# Patient Record
Sex: FEMALE | Employment: OTHER | URBAN - METROPOLITAN AREA
[De-identification: names, ages, dates, MRNs, and addresses within clinical notes are randomized per-mention and may not be internally consistent; named-entity substitution may affect disease eponyms.]

---

## 2018-09-04 ENCOUNTER — HOSPITAL ENCOUNTER (OUTPATIENT)
Age: 58
Setting detail: OBSERVATION
LOS: 1 days | Discharge: HOME | End: 2018-09-05
Attending: EMERGENCY MEDICINE | Admitting: HOSPITALIST
Payer: SELF-PAY

## 2018-09-04 ENCOUNTER — APPOINTMENT (OUTPATIENT)
Dept: GENERAL RADIOLOGY | Age: 58
End: 2018-09-04
Attending: EMERGENCY MEDICINE
Payer: SELF-PAY

## 2018-09-04 DIAGNOSIS — I16.1 HYPERTENSIVE EMERGENCY: ICD-10-CM

## 2018-09-04 DIAGNOSIS — R77.8 ELEVATED TROPONIN I LEVEL: ICD-10-CM

## 2018-09-04 DIAGNOSIS — R07.9 CHEST PAIN, UNSPECIFIED TYPE: ICD-10-CM

## 2018-09-04 DIAGNOSIS — I48.91 ATRIAL FIBRILLATION WITH RVR (HCC): Primary | ICD-10-CM

## 2018-09-04 PROBLEM — R07.89 ATYPICAL CHEST PAIN: Status: ACTIVE | Noted: 2018-09-04

## 2018-09-04 LAB
ALBUMIN SERPL-MCNC: 3.6 G/DL (ref 3.4–5)
ALBUMIN/GLOB SERPL: 1 {RATIO} (ref 0.8–1.7)
ALP SERPL-CCNC: 137 U/L (ref 45–117)
ALT SERPL-CCNC: 83 U/L (ref 13–56)
ANION GAP SERPL CALC-SCNC: 7 MMOL/L (ref 3–18)
APTT PPP: 28.3 SEC (ref 23–36.4)
AST SERPL-CCNC: 45 U/L (ref 15–37)
BASOPHILS # BLD: 0 K/UL (ref 0–0.1)
BASOPHILS # BLD: 0 K/UL (ref 0–0.1)
BASOPHILS NFR BLD: 0 % (ref 0–2)
BASOPHILS NFR BLD: 0 % (ref 0–2)
BILIRUB SERPL-MCNC: 0.6 MG/DL (ref 0.2–1)
BUN SERPL-MCNC: 12 MG/DL (ref 7–18)
BUN/CREAT SERPL: 13 (ref 12–20)
CALCIUM SERPL-MCNC: 8.6 MG/DL (ref 8.5–10.1)
CHLORIDE SERPL-SCNC: 106 MMOL/L (ref 100–108)
CO2 SERPL-SCNC: 30 MMOL/L (ref 21–32)
CREAT SERPL-MCNC: 0.96 MG/DL (ref 0.6–1.3)
DIFFERENTIAL METHOD BLD: ABNORMAL
DIFFERENTIAL METHOD BLD: ABNORMAL
EOSINOPHIL # BLD: 0.1 K/UL (ref 0–0.4)
EOSINOPHIL # BLD: 0.1 K/UL (ref 0–0.4)
EOSINOPHIL NFR BLD: 1 % (ref 0–5)
EOSINOPHIL NFR BLD: 1 % (ref 0–5)
ERYTHROCYTE [DISTWIDTH] IN BLOOD BY AUTOMATED COUNT: 14.2 % (ref 11.6–14.5)
ERYTHROCYTE [DISTWIDTH] IN BLOOD BY AUTOMATED COUNT: 14.3 % (ref 11.6–14.5)
GLOBULIN SER CALC-MCNC: 3.5 G/DL (ref 2–4)
GLUCOSE SERPL-MCNC: 88 MG/DL (ref 74–99)
HCT VFR BLD AUTO: 38 % (ref 35–45)
HCT VFR BLD AUTO: 38.4 % (ref 35–45)
HGB BLD-MCNC: 12.4 G/DL (ref 12–16)
HGB BLD-MCNC: 12.8 G/DL (ref 12–16)
LYMPHOCYTES # BLD: 1.9 K/UL (ref 0.9–3.6)
LYMPHOCYTES # BLD: 2.2 K/UL (ref 0.9–3.6)
LYMPHOCYTES NFR BLD: 19 % (ref 21–52)
LYMPHOCYTES NFR BLD: 23 % (ref 21–52)
MCH RBC QN AUTO: 29.6 PG (ref 24–34)
MCH RBC QN AUTO: 29.9 PG (ref 24–34)
MCHC RBC AUTO-ENTMCNC: 32.6 G/DL (ref 31–37)
MCHC RBC AUTO-ENTMCNC: 33.3 G/DL (ref 31–37)
MCV RBC AUTO: 89.7 FL (ref 74–97)
MCV RBC AUTO: 90.7 FL (ref 74–97)
MONOCYTES # BLD: 0.7 K/UL (ref 0.05–1.2)
MONOCYTES # BLD: 0.7 K/UL (ref 0.05–1.2)
MONOCYTES NFR BLD: 7 % (ref 3–10)
MONOCYTES NFR BLD: 8 % (ref 3–10)
NEUTS SEG # BLD: 6.6 K/UL (ref 1.8–8)
NEUTS SEG # BLD: 7.4 K/UL (ref 1.8–8)
NEUTS SEG NFR BLD: 68 % (ref 40–73)
NEUTS SEG NFR BLD: 73 % (ref 40–73)
PLATELET # BLD AUTO: 155 K/UL (ref 135–420)
PLATELET # BLD AUTO: 169 K/UL (ref 135–420)
PMV BLD AUTO: 13.1 FL (ref 9.2–11.8)
PMV BLD AUTO: 13.8 FL (ref 9.2–11.8)
POTASSIUM SERPL-SCNC: 3.3 MMOL/L (ref 3.5–5.5)
PROT SERPL-MCNC: 7.1 G/DL (ref 6.4–8.2)
RBC # BLD AUTO: 4.19 M/UL (ref 4.2–5.3)
RBC # BLD AUTO: 4.28 M/UL (ref 4.2–5.3)
SODIUM SERPL-SCNC: 143 MMOL/L (ref 136–145)
T4 FREE SERPL-MCNC: 1.1 NG/DL (ref 0.7–1.5)
TROPONIN I SERPL-MCNC: 0.25 NG/ML (ref 0–0.04)
TROPONIN I SERPL-MCNC: 0.29 NG/ML (ref 0–0.04)
TSH SERPL DL<=0.05 MIU/L-ACNC: 0.12 UIU/ML (ref 0.36–3.74)
WBC # BLD AUTO: 10.2 K/UL (ref 4.6–13.2)
WBC # BLD AUTO: 9.6 K/UL (ref 4.6–13.2)

## 2018-09-04 PROCEDURE — 99285 EMERGENCY DEPT VISIT HI MDM: CPT

## 2018-09-04 PROCEDURE — 65660000000 HC RM CCU STEPDOWN

## 2018-09-04 PROCEDURE — 74011000258 HC RX REV CODE- 258: Performed by: HOSPITALIST

## 2018-09-04 PROCEDURE — 71045 X-RAY EXAM CHEST 1 VIEW: CPT

## 2018-09-04 PROCEDURE — 96361 HYDRATE IV INFUSION ADD-ON: CPT

## 2018-09-04 PROCEDURE — 84443 ASSAY THYROID STIM HORMONE: CPT | Performed by: EMERGENCY MEDICINE

## 2018-09-04 PROCEDURE — 36415 COLL VENOUS BLD VENIPUNCTURE: CPT | Performed by: HOSPITALIST

## 2018-09-04 PROCEDURE — 74011000258 HC RX REV CODE- 258: Performed by: EMERGENCY MEDICINE

## 2018-09-04 PROCEDURE — 85025 COMPLETE CBC W/AUTO DIFF WBC: CPT | Performed by: EMERGENCY MEDICINE

## 2018-09-04 PROCEDURE — 84439 ASSAY OF FREE THYROXINE: CPT | Performed by: EMERGENCY MEDICINE

## 2018-09-04 PROCEDURE — 93005 ELECTROCARDIOGRAM TRACING: CPT

## 2018-09-04 PROCEDURE — 96374 THER/PROPH/DIAG INJ IV PUSH: CPT

## 2018-09-04 PROCEDURE — 74011000250 HC RX REV CODE- 250: Performed by: EMERGENCY MEDICINE

## 2018-09-04 PROCEDURE — 80053 COMPREHEN METABOLIC PANEL: CPT | Performed by: EMERGENCY MEDICINE

## 2018-09-04 PROCEDURE — 94761 N-INVAS EAR/PLS OXIMETRY MLT: CPT

## 2018-09-04 PROCEDURE — 74011250636 HC RX REV CODE- 250/636: Performed by: EMERGENCY MEDICINE

## 2018-09-04 PROCEDURE — 84484 ASSAY OF TROPONIN QUANT: CPT | Performed by: EMERGENCY MEDICINE

## 2018-09-04 PROCEDURE — 85730 THROMBOPLASTIN TIME PARTIAL: CPT | Performed by: HOSPITALIST

## 2018-09-04 PROCEDURE — 74011250637 HC RX REV CODE- 250/637: Performed by: EMERGENCY MEDICINE

## 2018-09-04 PROCEDURE — 74011250636 HC RX REV CODE- 250/636: Performed by: HOSPITALIST

## 2018-09-04 RX ORDER — GUAIFENESIN 100 MG/5ML
81 LIQUID (ML) ORAL DAILY
Status: DISCONTINUED | OUTPATIENT
Start: 2018-09-05 | End: 2018-09-05 | Stop reason: HOSPADM

## 2018-09-04 RX ORDER — LEVOTHYROXINE SODIUM 100 UG/1
100 TABLET ORAL
COMMUNITY

## 2018-09-04 RX ORDER — HEPARIN SODIUM 10000 [USP'U]/100ML
18-36 INJECTION, SOLUTION INTRAVENOUS
Status: DISCONTINUED | OUTPATIENT
Start: 2018-09-04 | End: 2018-09-05

## 2018-09-04 RX ORDER — LORATADINE 10 MG/1
10 TABLET ORAL ONCE
Status: COMPLETED | OUTPATIENT
Start: 2018-09-04 | End: 2018-09-04

## 2018-09-04 RX ORDER — ACETAMINOPHEN 325 MG/1
650 TABLET ORAL
Status: DISCONTINUED | OUTPATIENT
Start: 2018-09-04 | End: 2018-09-05 | Stop reason: HOSPADM

## 2018-09-04 RX ORDER — ATORVASTATIN CALCIUM 40 MG/1
40 TABLET, FILM COATED ORAL
Status: DISCONTINUED | OUTPATIENT
Start: 2018-09-04 | End: 2018-09-04

## 2018-09-04 RX ORDER — HEPARIN SODIUM 1000 [USP'U]/ML
80 INJECTION, SOLUTION INTRAVENOUS; SUBCUTANEOUS ONCE
Status: COMPLETED | OUTPATIENT
Start: 2018-09-04 | End: 2018-09-04

## 2018-09-04 RX ORDER — ATORVASTATIN CALCIUM 40 MG/1
40 TABLET, FILM COATED ORAL DAILY
Status: DISCONTINUED | OUTPATIENT
Start: 2018-09-06 | End: 2018-09-05 | Stop reason: HOSPADM

## 2018-09-04 RX ORDER — ONDANSETRON 2 MG/ML
4 INJECTION INTRAMUSCULAR; INTRAVENOUS
Status: DISCONTINUED | OUTPATIENT
Start: 2018-09-04 | End: 2018-09-05 | Stop reason: HOSPADM

## 2018-09-04 RX ORDER — SODIUM CHLORIDE 9 MG/ML
75 INJECTION, SOLUTION INTRAVENOUS CONTINUOUS
Status: DISCONTINUED | OUTPATIENT
Start: 2018-09-04 | End: 2018-09-05 | Stop reason: HOSPADM

## 2018-09-04 RX ORDER — LISINOPRIL 20 MG/1
20 TABLET ORAL DAILY
COMMUNITY
End: 2019-12-25

## 2018-09-04 RX ORDER — NITROGLYCERIN 0.4 MG/1
0.4 TABLET SUBLINGUAL AS NEEDED
Status: COMPLETED | OUTPATIENT
Start: 2018-09-04 | End: 2018-09-05

## 2018-09-04 RX ORDER — DILTIAZEM HYDROCHLORIDE 5 MG/ML
20 INJECTION INTRAVENOUS
Status: COMPLETED | OUTPATIENT
Start: 2018-09-04 | End: 2018-09-04

## 2018-09-04 RX ORDER — GUAIFENESIN 100 MG/5ML
324 LIQUID (ML) ORAL
Status: COMPLETED | OUTPATIENT
Start: 2018-09-04 | End: 2018-09-04

## 2018-09-04 RX ADMIN — SODIUM CHLORIDE 1000 ML: 900 INJECTION, SOLUTION INTRAVENOUS at 19:35

## 2018-09-04 RX ADMIN — HEPARIN SODIUM AND DEXTROSE 18 UNITS/KG/HR: 10000; 5 INJECTION INTRAVENOUS at 22:22

## 2018-09-04 RX ADMIN — SODIUM CHLORIDE 75 ML/HR: 900 INJECTION, SOLUTION INTRAVENOUS at 22:31

## 2018-09-04 RX ADMIN — SODIUM CHLORIDE 5 MG/HR: 900 INJECTION, SOLUTION INTRAVENOUS at 20:40

## 2018-09-04 RX ADMIN — ASPIRIN 81 MG CHEWABLE TABLET 324 MG: 81 TABLET CHEWABLE at 19:43

## 2018-09-04 RX ADMIN — NITROGLYCERIN 0.4 MG: 0.4 TABLET SUBLINGUAL at 19:45

## 2018-09-04 RX ADMIN — HEPARIN SODIUM 8710 UNITS: 1000 INJECTION, SOLUTION INTRAVENOUS; SUBCUTANEOUS at 22:28

## 2018-09-04 RX ADMIN — NITROGLYCERIN 0.4 MG: 0.4 TABLET SUBLINGUAL at 20:13

## 2018-09-04 RX ADMIN — SODIUM CHLORIDE 5 MG/HR: 900 INJECTION, SOLUTION INTRAVENOUS at 21:35

## 2018-09-04 RX ADMIN — DILTIAZEM HYDROCHLORIDE 20 MG: 5 INJECTION INTRAVENOUS at 20:15

## 2018-09-04 RX ADMIN — LORATADINE 10 MG: 10 TABLET ORAL at 19:43

## 2018-09-04 NOTE — IP AVS SNAPSHOT
[address] [address] 
[phone] Patient: Shane Pinto MRN: [id number] HTP:[id number] About your hospitalization You were admitted on:  September 4, 2018 You last received care in the:  [address] [place] [address] You were discharged on:  September 5, 2018 Why you were hospitalized Your primary diagnosis was:  Atrial Fibrillation With Rvr (Hcc) Your diagnoses also included:  Atypical Chest Pain, Htn (Hypertension), Acquired Hypothyroidism Follow-up Information Follow up With Details Comments Contact Info Susan Johnson MD   [address] [place] [address] Drive [facility] [address] [phone] Discharge Orders None A check hope indicates which time of day the medication should be taken. My Medications START taking these medications Instructions Each Dose to Equal  
 Morning Noon Evening Bedtime  
 aspirin 81 mg chewable tablet Start taking on:  9/6/2018 Take 1 Tab by mouth daily. 81 mg  
    
  
   
   
   
  
 atorvastatin 40 mg tablet Commonly known as:  LIPITOR Start taking on:  9/6/2018 Take 1 Tab by mouth daily. 40 mg  
    
  
   
   
   
  
 metoprolol tartrate 25 mg tablet Commonly known as:  LOPRESSOR Take 1 Tab by mouth every twelve (12) hours. 25 mg CONTINUE taking these medications Instructions Each Dose to Equal  
 Morning Noon Evening Bedtime  
 levothyroxine 100 mcg tablet Commonly known as:  SYNTHROID Take 100 mcg by mouth Daily (before breakfast). 100 mcg  
    
  
   
   
   
  
 lisinopril 20 mg tablet Commonly known as:  Cruz Anibal Take 20 mg by mouth daily. 20 mg Where to Get Your Medications These medications were sent to [facility], [address]  [address], [address] [facility] [place] [address] Phone:  [phone]  
  aspirin 81 mg chewable tablet  
 atorvastatin 40 mg tablet  
 metoprolol tartrate 25 mg tablet Discharge Instructions FOLLOW UP VISIT Appointment in: One Week Please follow up with your PCP within 7 days to discuss your recent hospitalization. Patient to arrange. Follow up with cardiology in 2-3 weeks. Patient to arrange. DISCHARGE SUMMARY from Nurse PATIENT INSTRUCTIONS: 
 
 
F-face looks uneven A-arms unable to move or move unevenly S-speech slurred or non-existent T-time-call 911 as soon as signs and symptoms begin-DO NOT go Back to bed or wait to see if you get better-TIME IS BRAIN. Warning Signs of HEART ATTACK Call 911 if you have these symptoms: 
? Chest discomfort. Most heart attacks involve discomfort in the center of the chest that lasts more than a few minutes, or that goes away and comes back. It can feel like uncomfortable pressure, squeezing, fullness, or pain. ? Discomfort in other areas of the upper body. Symptoms can include pain or discomfort in one or both arms, the back, neck, jaw, or stomach. ? Shortness of breath with or without chest discomfort. ? Other signs may include breaking out in a cold sweat, nausea, or lightheadedness. Don't wait more than five minutes to call [address]! Fast action can save your life. Calling 911 is almost always the fastest way to get lifesaving treatment. Emergency Medical Services staff can begin treatment when they arrive  up to an hour sooner than if someone gets to the hospital by car. The discharge information has been reviewed with the patient.   The patient verbalized understanding. Discharge medications reviewed with the patient and appropriate educational materials and side effects teaching were provided. ___________________________________________________________________________________________________________________________________ Patient armband removed and shredded. MyChart Activation Thank you for enrolling in [address] Please follow the instructions below to securely access your online medical record. [employer] allows you to send messages to your doctor, view your test results, renew your prescriptions, schedule appointments, and more. How Do I Sign Up? 1. In your internet browser, go to [email or web address]. 2. Click on the First Time User? Click Here link in the Sign In box. You will see the New Member Sign Up page. 3. Enter your [employer] Access Code exactly as it appears below. You will not need to use this code after youve completed the sign-up process. If you do not sign up before the expiration date, you must request a new code. [employer] Access Code: [email or web address] Expires: 12/3/2018  5:37 PM  
 
4. Enter the last four digits of your Social Security Number (xxxx) and Date of Birth (mm/dd/yyyy) as indicated and click Submit. You will be taken to the next sign-up page. 5. Create a [employer] ID. This will be your [employer] login ID and cannot be changed, so think of one that is secure and easy to remember. 6. Create a [employer] password. You can change your password at any time. 7. Enter your Password Reset Question and Answer. This can be used at a later time if you forget your password. 8. Enter your e-mail address. You will receive e-mail notification when new information is available in [address] 9. Click Sign Up. You can now view your medical record. Additional Information Remember, [employer] is NOT to be used for urgent needs. For medical emergencies, dial 911. Now available from your iPhone and Android! Atrial Fibrillation: Care Instructions Your Care Instructions Atrial fibrillation is an irregular and often fast heartbeat. Treating this condition is important for several reasons. It can cause blood clots, which can travel from your heart to your brain and cause a stroke. If you have a fast heartbeat, you may feel lightheaded, dizzy, and weak. An irregular heartbeat can also increase your risk for heart failure. Atrial fibrillation is often the result of another heart condition, such as high blood pressure or coronary artery disease. Making changes to improve your heart condition will help you stay healthy and active. Follow-up care is a key part of your treatment and safety. Be sure to make and go to all appointments, and call your doctor if you are having problems. It's also a good idea to know your test results and keep a list of the medicines you take. How can you care for yourself at home? Medicines 
  · Take your medicines exactly as prescribed. Call your doctor if you think you are having a problem with your medicine. You will get more details on the specific medicines your doctor prescribes.  
  · If your doctor has given you a blood thinner to prevent a stroke, be sure you get instructions about how to take your medicine safely. Blood thinners can cause serious bleeding problems.  
  · Do not take any vitamins, over-the-counter drugs, or herbal products without talking to your doctor first.  
 Lifestyle changes 
  · Do not smoke. Smoking can increase your chance of a stroke and heart attack. If you need help quitting, talk to your doctor about stop-smoking programs and medicines. These can increase your chances of quitting for good.  
  · Eat a heart-healthy diet.  
  · Stay at a healthy weight. Lose weight if you need to.  
  · Limit alcohol to 2 drinks a day for men and 1 drink a day for women. Too much alcohol can cause health problems.   · Avoid colds and flu. Get a pneumococcal vaccine shot. If you have had one before, ask your doctor whether you need another dose. Get a flu shot every year. If you must be around people with colds or flu, wash your hands often. Activity 
  · If your doctor recommends it, get more exercise. Walking is a good choice. Bit by bit, increase the amount you walk every day. Try for at least 30 minutes on most days of the week. You also may want to swim, bike, or do other activities. Your doctor may suggest that you join a cardiac rehabilitation program so that you can have help increasing your physical activity safely.  
  · Start light exercise if your doctor says it is okay. Even a small amount will help you get stronger, have more energy, and manage stress. Walking is an easy way to get exercise. Start out by walking a little more than you did in the hospital. Gradually increase the amount you walk.  
  · When you exercise, watch for signs that your heart is working too hard. You are pushing too hard if you cannot talk while you are exercising. If you become short of breath or dizzy or have chest pain, sit down and rest immediately.  
  · Check your pulse regularly. Place two fingers on the artery at the palm side of your wrist, in line with your thumb. If your heartbeat seems uneven or fast, talk to your doctor. When should you call for help? Call 911 anytime you think you may need emergency care. For example, call if: 
  · You have symptoms of a heart attack. These may include: ¨ Chest pain or pressure, or a strange feeling in the chest. 
¨ Sweating. ¨ Shortness of breath. ¨ Nausea or vomiting. ¨ Pain, pressure, or a strange feeling in the back, neck, jaw, or upper belly or in one or both shoulders or arms. ¨ Lightheadedness or sudden weakness. ¨ A fast or irregular heartbeat.  
After you call 911, the  may tell you to chew 1 adult-strength or 2 to 4 low-dose aspirin. Wait for an ambulance. Do not try to drive yourself.  
  · You have symptoms of a stroke. These may include: 
¨ Sudden numbness, tingling, weakness, or loss of movement in your face, arm, or leg, especially on only one side of your body. ¨ Sudden vision changes. ¨ Sudden trouble speaking. ¨ Sudden confusion or trouble understanding simple statements. ¨ Sudden problems with walking or balance. ¨ A sudden, severe headache that is different from past headaches.  
  · You passed out (lost consciousness).  
 Call your doctor now or seek immediate medical care if: 
  · You have new or increased shortness of breath.  
  · You feel dizzy or lightheaded, or you feel like you may faint.  
  · Your heart rate becomes irregular.  
  · You can feel your heart flutter in your chest or skip heartbeats. Tell your doctor if these symptoms are new or worse.  
 Watch closely for changes in your health, and be sure to contact your doctor if you have any problems. Where can you learn more? Go to http://charan-melvin.info/. Enter U020 in the search box to learn more about \"Atrial Fibrillation: Care Instructions. \" Current as of: December 6, 2017 Content Version: 11.7 © 3433-9220 navabi. Care instructions adapted under license by [employer] (which disclaims liability or warranty for this information). If you have questions about a medical condition or this instruction, always ask your healthcare professional. [address] [address] any warranty or liability for your use of this information. Learning About Atrial Fibrillation What is atrial fibrillation? Atrial fibrillation (say \"AY-tree-hillary llw-piqs-GET-shun\") is the most common type of irregular heartbeat (arrhythmia). Normally, the heart beats in a strong, steady rhythm.  In atrial fibrillation, a problem with the heart's electrical system causes the two upper parts of the heart (the atria) to quiver, or fibrillate. Your heart rate also may be faster than normal. 
Atrial fibrillation can be dangerous because if the heartbeat isn't strong and steady, blood can collect, or pool, in the atria. And pooled blood is more likely to form clots. Clots can travel to the brain, block blood flow, and cause a stroke. Atrial fibrillation can also lead to heart failure. Treatment for atrial fibrillation helps prevent stroke and heart failure. It also helps relieve symptoms. Atrial fibrillation is often caused by another heart problem. It may happen after heart surgery. It may also be caused by other problems, such as an overactive thyroid gland or lung disease. Many people with atrial fibrillation are able to live full and active lives. What are the symptoms? Some people feel symptoms when they have episodes of atrial fibrillation. But other people don't notice any symptoms. If you have symptoms, you may feel: · A fluttering, racing, or pounding feeling in your chest called palpitations. · Weak or tired. · Dizzy or lightheaded. · Short of breath. · Chest pain. · Confused. You may notice signs of atrial fibrillation when you check your pulse. Your pulse may seem uneven or fast. 
What can you expect when you have atrial fibrillation? At first, spells of atrial fibrillation may come on suddenly and last a short time. It may go away on its own or it goes away after treatment. This is called paroxysmal atrial fibrillation. Over time, the spells may last longer and occur more often. They often don't go away on their own. How is it treated? Treatments can help you feel better and prevent future problems, especially stroke and heart failure. The main types of treatment slow the heart rate, control the heart rhythm, and help prevent stroke.  Your treatment will depend on the cause of your atrial fibrillation, your symptoms, and your risk for stroke. · Heart rate treatment. Medicine may be used to slow your heart rate. Your heartbeat may still be irregular. But these medicines keep your heart from beating too fast. They may also help relieve your symptoms. · Heart rhythm treatment. Different treatments may be used to try to stop atrial fibrillation and keep it from returning. They can also relieve symptoms. These treatments include medicine, electrical cardioversion to shock the heart back to a normal rhythm, a procedure called catheter ablation, and heart surgery. · Stroke prevention. You and your doctor can decide how to lower your risk. You may decide to take a blood-thinning medicine, such as aspirin or an anticoagulant. How can you live well with it? You can live well and help manage atrial fibrillation by having a heart-healthy lifestyle. To have a heart-healthy lifestyle: · Don't smoke. · Eat heart-healthy foods. · Be active. Talk to your doctor about what type and level of exercise is safe for you. · Stay at a healthy weight. Lose weight if you need to. · Manage stress. · Avoid alcohol if it triggers symptoms. · Manage other health problems such as high blood pressure, high cholesterol, and diabetes. · Avoid getting sick from the flu. Get a flu shot every year. Where can you learn more? Go to http://charan-melvin.info/. Enter [phone] in the search box to learn more about \"Learning About Atrial Fibrillation. \" Current as of: December 6, 2017 Content Version: 11.7 © 2489-5972 Share[employer]k. Care instructions adapted under license by [employer] (which disclaims liability or warranty for this information). If you have questions about a medical condition or this instruction, always ask your healthcare professional. [address] [address] any warranty or liability for your use of this information. Deciding Between Electrical Cardioversion and Rate Control Medicines for Atrial Fibrillation Deciding Between Electrical Cardioversion and Rate Control Medicines for Atrial Fibrillation What is atrial fibrillation? Atrial fibrillation (say \"FLAKITA-tree-hillary iou-ixya-ZSW-shun\") is a kind of uneven heartbeat. It can make you feel lightheaded and dizzy. You may feel weak. It also can make you more likely to have a stroke. Electrical cardioversion can return your heart to a normal rhythm. First you'll get medicines to make you sleepy and control pain. Then your doctor will use patches to send an electric current to your heart. This resets the rhythm of your heart. Not everyone with atrial fibrillation needs this treatment. For some people, taking medicines may be better. Most people can live with an uneven heartbeat. It just has to be kept under control so the heart does not beat too fast. 
Use this information to help you and your doctor decide which treatment to choose for atrial fibrillation. What are carrera points about this decision? · Electrical cardioversion can return your heart to a normal rhythm. But the problem can come back. The longer you have had atrial fibrillation, the more likely it is to come back after this treatment. · Cardioversion may not work as well when an uneven heartbeat is caused by another heart disease, such as heart failure. · If your symptoms bother you a lot, you may want to try cardioversion. But even if it works, you may still need to take blood thinners to prevent a stroke. · If you don't have symptoms, or if they don't bother you much, you can try medicines to slow your heart rate. And you can take blood thinners to prevent a stroke. · Cardioversion does have risks, such as stroke. Discuss the risks with your doctor. Make sure you understand them. · You may have more than one heart problem. Cardioversion doesn't work as well if you have more than one heart problem. Why might you choose electrical cardioversion? · It restores the normal heart rhythm for most people. · The idea of having an electric shock does not bother you. · Your symptoms bother you a lot. · You have had atrial fibrillation just one time. · You do not have other heart problems. · You may not have to take as many medicines. Or you may not need to take them as long. Why might you choose rate-control medicines? · These medicines keep many people from having symptoms. · You prefer to take medicines rather than have an electric shock. · Your symptoms don't bother you much. · If these medicines don't work, you can still try electrical cardioversion. Your decision Thinking about the facts and your feelings can help you make a decision that is right for you. Be sure you understand the benefits and risks of your options. And think about what else you need to do before you make the decision. Where can you learn more? Go to http://charan-melvin.info/. Enter O971 in the search box to learn more about \"Deciding Between Electrical Cardioversion and Rate Control Medicines for Atrial Fibrillation. \" Current as of: December 6, 2017 Content Version: 11.7 © 9054-9879 Lok[employer]. Care instructions adapted under license by [employer] (which disclaims liability or warranty for this information). If you have questions about a medical condition or this instruction, always ask your healthcare professional. [address] [address] any warranty or liability for your use of this information. Musculoskeletal Chest Pain: Care Instructions Your Care Instructions Chest pain is not always a sign that something is wrong with your heart or that you have another serious problem. The doctor thinks your chest pain is caused by strained muscles or ligaments, inflamed chest cartilage, or another problem in your chest, rather than by your heart.  You may need more tests to find the cause of your chest pain. Follow-up care is a key part of your treatment and safety. Be sure to make and go to all appointments, and call your doctor if you are having problems. It's also a good idea to know your test results and keep a list of the medicines you take. How can you care for yourself at home? · Take pain medicines exactly as directed. ¨ If the doctor gave you a prescription medicine for pain, take it as prescribed. ¨ If you are not taking a prescription pain medicine, ask your doctor if you can take an over-the-counter medicine. · Rest and protect the sore area. · Stop, change, or take a break from any activity that may be causing your pain or soreness. · Put ice or a cold pack on the sore area for 10 to 20 minutes at a time. Try to do this every 1 to 2 hours for the next 3 days (when you are awake) or until the swelling goes down. Put a thin cloth between the ice and your skin. · After 2 or 3 days, apply a heating pad set on low or a warm cloth to the area that hurts. Some doctors suggest that you go back and forth between hot and cold. · Do not wrap or tape your ribs for support. This may cause you to take smaller breaths, which could increase your risk of lung problems. · Mentholated creams such as Bengay or Icy Hot may soothe sore muscles. Follow the instructions on the package. · Follow your doctor's instructions for exercising. · Gentle stretching and massage may help you get better faster. Stretch slowly to the point just before pain begins, and hold the stretch for at least 15 to 30 seconds. Do this 3 or 4 times a day. Stretch just after you have applied heat. · As your pain gets better, slowly return to your normal activities. Any increased pain may be a sign that you need to rest a while longer. When should you call for help? Call 911 anytime you think you may need emergency care. For example, call if:   · You have chest pain or pressure. This may occur with: ¨ Sweating. ¨ Shortness of breath. ¨ Nausea or vomiting. ¨ Pain that spreads from the chest to the neck, jaw, or one or both shoulders or arms. ¨ Dizziness or lightheadedness. ¨ A fast or uneven pulse. After calling 911, chew 1 adult-strength aspirin. Wait for an ambulance. Do not try to drive yourself.  
  · You have sudden chest pain and shortness of breath, or you cough up blood.  
 Call your doctor now or seek immediate medical care if: 
  · You have any trouble breathing.  
  · Your chest pain gets worse.  
  · Your chest pain occurs consistently with exercise and is relieved by rest.  
 Watch closely for changes in your health, and be sure to contact your doctor if: 
  · Your chest pain does not get better after 1 week. Where can you learn more? Go to http://charanAppyZoomelvin.info/. Enter V293 in the search box to learn more about \"Musculoskeletal Chest Pain: Care Instructions. \" Current as of: November 20, 2017 Content Version: 11.7 © 0183-3845 [employer] ([employer]). Care instructions adapted under license by [employer] (which disclaims liability or warranty for this information). If you have questions about a medical condition or this instruction, always ask your healthcare professional. [address] any warranty or liability for your use of this information. Chest Pain: Care Instructions Your Care Instructions There are many things that can cause chest pain. Some are not serious and will get better on their own in a few days. But some kinds of chest pain need more testing and treatment. Your doctor may have recommended a follow-up visit in the next 8 to 12 hours. If you are not getting better, you may need more tests or treatment. Even though your doctor has released you, you still need to watch for any problems.  The doctor carefully checked you, but sometimes problems can develop later. If you have new symptoms or if your symptoms do not get better, get medical care right away. If you have worse or different chest pain or pressure that lasts more than 5 minutes or you passed out (lost consciousness), call 911 or seek other emergency help right away. A medical visit is only one step in your treatment. Even if you feel better, you still need to do what your doctor recommends, such as going to all suggested follow-up appointments and taking medicines exactly as directed. This will help you recover and help prevent future problems. How can you care for yourself at home? · Rest until you feel better. · Take your medicine exactly as prescribed. Call your doctor if you think you are having a problem with your medicine. · Do not drive after taking a prescription pain medicine. When should you call for help? Call 911 if: 
  · You passed out (lost consciousness).  
  · You have severe difficulty breathing.  
  · You have symptoms of a heart attack. These may include: ¨ Chest pain or pressure, or a strange feeling in your chest. 
¨ Sweating. ¨ Shortness of breath. ¨ Nausea or vomiting. ¨ Pain, pressure, or a strange feeling in your back, neck, jaw, or upper belly or in one or both shoulders or arms. ¨ Lightheadedness or sudden weakness. ¨ A fast or irregular heartbeat. After you call 911, the  may tell you to chew 1 adult-strength or 2 to 4 low-dose aspirin. Wait for an ambulance. Do not try to drive yourself.  
 Call your doctor today if: 
  · You have any trouble breathing.  
  · Your chest pain gets worse.  
  · You are dizzy or lightheaded, or you feel like you may faint.  
  · You are not getting better as expected.  
  · You are having new or different chest pain. Where can you learn more? Go to http://[email or web address]-melvin.info/. Enter A120 in the search box to learn more about \"Chest Pain: Care Instructions. \" Current as of: November 20, 2017 Content Version: 11.7 © 1729-6978 A[employer] Digital Music. Care instructions adapted under license by [employer] (which disclaims liability or warranty for this information). If you have questions about a medical condition or this instruction, always ask your healthcare professional. [address] any warranty or liability for your use of this information. Acute High Blood Pressure: Care Instructions Your Care Instructions Acute high blood pressure is very high blood pressure. It's a serious problem. Very high blood pressure can damage your brain, heart, eyes, and kidneys. You may have been given medicines to lower your blood pressure. You may have gotten them as pills or through a needle in one of your veins. This is called an IV. And maybe you were given other medicines too. These can be needed when high blood pressure causes other problems. To keep your blood pressure at a lower level, you may need to make healthy lifestyle changes. And you will probably need to take medicines. Be sure to follow up with your doctor about your blood pressure and what you can do about it. Follow-up care is a key part of your treatment and safety. Be sure to make and go to all appointments, and call your doctor if you are having problems. It's also a good idea to know your test results and keep a list of the medicines you take. How can you care for yourself at home? · See your doctor as often as he or she recommends. This is to make sure your blood pressure is under control. You will probably go at least 2 times a year. But it may be more often at first. 
· Take your blood pressure medicine exactly as prescribed. You may take one or more types. They include diuretics, beta-blockers, ACE inhibitors, calcium channel blockers, and angiotensin II receptor blockers. Call your doctor if you think you are having a problem with your medicine. · If you take blood pressure medicine, talk to your doctor before you take decongestants or anti-inflammatory medicine, such as ibuprofen. These can raise blood pressure. · Learn how to check your blood pressure at home. Check it often. · Ask your doctor if it's okay to drink alcohol. · Talk to your doctor about lifestyle changes that can help blood pressure. These include being active and not smoking. When should you call for help? Call 911 anytime you think you may need emergency care. This may mean having symptoms that suggest that your blood pressure is causing a serious heart or blood vessel problem. Your blood pressure may be over 180/110. 
 For example, call 911 if: 
  · You have symptoms of a heart attack. These may include: ¨ Chest pain or pressure, or a strange feeling in the chest. 
¨ Sweating. ¨ Shortness of breath. ¨ Nausea or vomiting. ¨ Pain, pressure, or a strange feeling in the back, neck, jaw, or upper belly or in one or both shoulders or arms. ¨ Lightheadedness or sudden weakness. ¨ A fast or irregular heartbeat.  
  · You have symptoms of a stroke. These may include: 
¨ Sudden numbness, tingling, weakness, or loss of movement in your face, arm, or leg, especially on only one side of your body. ¨ Sudden vision changes. ¨ Sudden trouble speaking. ¨ Sudden confusion or trouble understanding simple statements. ¨ Sudden problems with walking or balance. ¨ A sudden, severe headache that is different from past headaches.  
  · You have severe back or belly pain.  
 Do not wait until your blood pressure comes down on its own. Get help right away. 
 Call your doctor now or seek immediate care if: 
  · Your blood pressure is much higher than normal (such as 180/110 or higher), but you don't have symptoms.  
  · You think high blood pressure is causing symptoms, such as: ¨ Severe headache. ¨ Blurry vision.  
 Watch closely for changes in your health, and be sure to contact your doctor if:   · Your blood pressure measures 140/90 or higher at least 2 times. That means the top number is 140 or higher or the bottom number is 90 or higher, or both.  
  · You think you may be having side effects from your blood pressure medicine.  
  · Your blood pressure is usually normal, but it goes above normal at least 2 times. Where can you learn more? Go to http://charan-melvin.info/. Enter L884 in the search box to learn more about \"Acute High Blood Pressure: Care Instructions. \" Current as of: May 10, 2017 Content Version: 11.7 © 9797-3665 ServiceGems. Care instructions adapted under license by [employer] (which disclaims liability or warranty for this information). If you have questions about a medical condition or this instruction, always ask your healthcare professional. [address] any warranty or liability for your use of this information. Low Sodium Diet (2,000 Milligram): Care Instructions Your Care Instructions Too much sodium causes your body to hold on to extra water. This can raise your blood pressure and force your heart and kidneys to work harder. In very serious cases, this could cause you to be put in the hospital. It might even be life-threatening. By limiting sodium, you will feel better and lower your risk of serious problems. The most common source of sodium is salt. People get most of the salt in their diet from canned, prepared, and packaged foods. Fast food and restaurant meals also are very high in sodium. Your doctor will probably limit your sodium to less than 2,000 milligrams (mg) a day. This limit counts all the sodium in prepared and packaged foods and any salt you add to your food. Follow-up care is a key part of your treatment and safety. Be sure to make and go to all appointments, and call your doctor if you are having problems.  It's also a good idea to know your test results and keep a list of the medicines you take. How can you care for yourself at home? Read food labels · Read labels on cans and food packages. The labels tell you how much sodium is in each serving. Make sure that you look at the serving size. If you eat more than the serving size, you have eaten more sodium. · Food labels also tell you the Percent Daily Value for sodium. Choose products with low Percent Daily Values for sodium. · Be aware that sodium can come in forms other than salt, including monosodium glutamate (MSG), sodium citrate, and sodium bicarbonate (baking soda). MSG is often added to Asian food. When you eat out, you can sometimes ask for food without MSG or added salt. Buy low-sodium foods · Buy foods that are labeled \"unsalted\" (no salt added), \"sodium-free\" (less than 5 mg of sodium per serving), or \"low-sodium\" (less than 140 mg of sodium per serving). Foods labeled \"reduced-sodium\" and \"light sodium\" may still have too much sodium. Be sure to read the label to see how much sodium you are getting. · Buy fresh vegetables, or frozen vegetables without added sauces. Buy low-sodium versions of canned vegetables, soups, and other canned goods. Prepare low-sodium meals · Cut back on the amount of salt you use in cooking. This will help you adjust to the taste. Do not add salt after cooking. One teaspoon of salt has about 2,300 mg of sodium. · Take the salt shaker off the table. · Flavor your food with garlic, lemon juice, onion, vinegar, herbs, and spices. Do not use soy sauce, lite soy sauce, steak sauce, onion salt, garlic salt, celery salt, mustard, or ketchup on your food. · Use low-sodium salad dressings, sauces, and ketchup. Or make your own salad dressings and sauces without adding salt. · Use less salt (or none) when recipes call for it. You can often use half the salt a recipe calls for without losing flavor. Other foods such as rice, pasta, and grains do not need added salt. · Rinse canned vegetables, and cook them in fresh water. This removes some-but not all-of the salt. · Avoid water that is naturally high in sodium or that has been treated with water softeners, which add sodium. Call your local water company to find out the sodium content of your water supply. If you buy bottled water, read the label and choose a sodium-free brand. Avoid high-sodium foods · Avoid eating: ¨ Smoked, cured, salted, and canned meat, fish, and poultry. ¨ Ham, cosby, hot dogs, and luncheon meats. ¨ Regular, hard, and processed cheese and regular peanut butter. ¨ Crackers with salted tops, and other salted snack foods such as pretzels, chips, and salted popcorn. ¨ Frozen prepared meals, unless labeled low-sodium. ¨ Canned and dried soups, broths, and bouillon, unless labeled sodium-free or low-sodium. ¨ Canned vegetables, unless labeled sodium-free or low-sodium. ¨ [place] fries, pizza, tacos, and other fast foods. ¨ Pickles, olives, ketchup, and other condiments, especially soy sauce, unless labeled sodium-free or low-sodium. Where can you learn more? Go to http://charan-melvin.info/. Enter U969 in the search box to learn more about \"Low Sodium Diet (2,000 Milligram): Care Instructions. \" Current as of: May 12, 2017 Content Version: 11.7 © 6075-0239 [employer]Scale, BlogRadio. Care instructions adapted under license by [employer] (which disclaims liability or warranty for this information). If you have questions about a medical condition or this instruction, always ask your healthcare professional. [place] [address] any warranty or liability for your use of this information. [facility] Announcement We are excited to announce that we are making your provider's discharge notes available to you in [facility].   You will see these notes when they are completed and signed by the physician that discharged you from your recent hospital stay. If you have any questions or concerns about any information you see in CliqSearch, please call the Health Information Department where you were seen or reach out to your Primary Care Provider for more information about your plan of care. Introducing [facility]! [employer] introduces CliqSearch patient portal. Now you can access parts of your medical record, email your doctor's office, and request medication refills online. 1. In your internet browser, go to [email or web address] 2. Click on the First Time User? Click Here link in the Sign In box. You will see the New Member Sign Up page. 3. Enter your CliqSearch Access Code exactly as it appears below. You will not need to use this code after youve completed the sign-up process. If you do not sign up before the expiration date, you must request a new code. · CliqSearch Access Code: [email or web address] Expires: 12/3/2018  5:37 PM 
 
4. Enter the last four digits of your Social Security Number (xxxx) and Date of Birth (mm/dd/yyyy) as indicated and click Submit. You will be taken to the next sign-up page. 5. Create a CliqSearch ID. This will be your CliqSearch login ID and cannot be changed, so think of one that is secure and easy to remember. 6. Create a CliqSearch password. You can change your password at any time. 7. Enter your Password Reset Question and Answer. This can be used at a later time if you forget your password. 8. Enter your e-mail address. You will receive e-mail notification when new information is available in [address] 9. Click Sign Up. You can now view and download portions of your medical record. 10. Click the Download Summary menu link to download a portable copy of your medical information. If you have questions, please visit the Frequently Asked Questions section of the CliqSearch website. Remember, CliqSearch is NOT to be used for urgent needs. For medical emergencies, dial 911. Now available from your iPhone and Android! Introducing Vijay Pete As a [facility] patient, I wanted to make you aware of our electronic visit tool called Vijay Pete. Perdoo 24/7 allows you to connect within minutes with a medical provider 24 hours a day, seven days a week via a mobile device or tablet or logging into a secure website from your computer. You can access Vijay Pete from anywhere in the [place]. A virtual visit might be right for you when you have a simple condition and feel like you just dont want to get out of bed, or cant get away from work for an appointment, when your regular [facility] provider is not available (evenings, weekends or holidays), or when youre out of town and need minor care. Electronic visits cost only $49 and if the Perdoo 24/7 provider determines a prescription is needed to treat your condition, one can be electronically transmitted to a nearby pharmacy*. Please take a moment to enroll today if you have not already done so. The enrollment process is free and takes just a few minutes. To enroll, please download the Perdoo 24/7 dianne to your tablet or phone, or visit [email or web address] to enroll on your computer. And, as an [address] [place] [address] patient with a Yamisee account, the results of your visits will be scanned into your electronic medical record and your primary care provider will be able to view the scanned results. We urge you to continue to see your regular [facility] provider for your ongoing medical care. And while your primary care provider may not be the one available when you seek a Vijay Pete virtual visit, the peace of mind you get from getting a real diagnosis real time can be priceless. For more information on Vijay Pete, view our Frequently Asked Questions (FAQs) at [email or web address]. Sincerely, 
 
Miguel Ángel Dangelo MD 
Chief Medical Officer Demetrio Purcell *:  certain medications cannot be prescribed via Vijay Pete Providers Seen During Your Hospitalization Provider Specialty Primary office phone Gabriel Buerger, MD Emergency Medicine [phone] Eddy Saavedra MD Internal Medicine [phone] Abraham Mccabe DO Internal Medicine [phone] Your Primary Care Physician (PCP) Primary Care Physician Office Phone Office Fax Blanca Jenkins [phone] You are allergic to the following Allergen Reactions Ampicillin Rash Penicillins Unknown (comments) Syncope Seafood Rash Shellfish Derived Hives Recent Documentation Height Weight BMI Smoking Status 1.651 m 108.9 kg 39.94 kg/m2 Never Smoker Emergency Contacts Name Discharge Info Relation Home Work Mobile Laurie Ibrahim DISCHARGE CAREGIVER [3] Mother [14] [phone] Patient Belongings The following personal items are in your possession at time of discharge: 
  Dental Appliances: None  Visual Aid: None      Home Medications: None   Jewelry: None  Clothing: Slippers    Other Valuables: None Discharge Instructions Attachments/References ATRIAL FIBRILLATION (ENGLISH) METOPROLOL (BY MOUTH) (ENGLISH) Patient Handouts Atrial Fibrillation: Care Instructions Your Care Instructions Atrial fibrillation is an irregular and often fast heartbeat. Treating this condition is important for several reasons. It can cause blood clots, which can travel from your heart to your brain and cause a stroke. If you have a fast heartbeat, you may feel lightheaded, dizzy, and weak. An irregular heartbeat can also increase your risk for heart failure. Atrial fibrillation is often the result of another heart condition, such as high blood pressure or coronary artery disease.  Making changes to improve your heart condition will help you stay healthy and active. Follow-up care is a key part of your treatment and safety. Be sure to make and go to all appointments, and call your doctor if you are having problems. It's also a good idea to know your test results and keep a list of the medicines you take. How can you care for yourself at home? Medicines 
  · Take your medicines exactly as prescribed. Call your doctor if you think you are having a problem with your medicine. You will get more details on the specific medicines your doctor prescribes.  
  · If your doctor has given you a blood thinner to prevent a stroke, be sure you get instructions about how to take your medicine safely. Blood thinners can cause serious bleeding problems.  
  · Do not take any vitamins, over-the-counter drugs, or herbal products without talking to your doctor first.  
 Lifestyle changes 
  · Do not smoke. Smoking can increase your chance of a stroke and heart attack. If you need help quitting, talk to your doctor about stop-smoking programs and medicines. These can increase your chances of quitting for good.  
  · Eat a heart-healthy diet.  
  · Stay at a healthy weight. Lose weight if you need to.  
  · Limit alcohol to 2 drinks a day for men and 1 drink a day for women. Too much alcohol can cause health problems.  
  · Avoid colds and flu. Get a pneumococcal vaccine shot. If you have had one before, ask your doctor whether you need another dose. Get a flu shot every year. If you must be around people with colds or flu, wash your hands often. Activity 
  · If your doctor recommends it, get more exercise. Walking is a good choice. Bit by bit, increase the amount you walk every day. Try for at least 30 minutes on most days of the week. You also may want to swim, bike, or do other activities.  Your doctor may suggest that you join a cardiac rehabilitation program so that you can have help increasing your physical activity safely.  
  · Start light exercise if your doctor says it is okay. Even a small amount will help you get stronger, have more energy, and manage stress. Walking is an easy way to get exercise. Start out by walking a little more than you did in the hospital. Gradually increase the amount you walk.  
  · When you exercise, watch for signs that your heart is working too hard. You are pushing too hard if you cannot talk while you are exercising. If you become short of breath or dizzy or have chest pain, sit down and rest immediately.  
  · Check your pulse regularly. Place two fingers on the artery at the palm side of your wrist, in line with your thumb. If your heartbeat seems uneven or fast, talk to your doctor. When should you call for help? Call 911 anytime you think you may need emergency care. For example, call if: 
  · You have symptoms of a heart attack. These may include: ¨ Chest pain or pressure, or a strange feeling in the chest. 
¨ Sweating. ¨ Shortness of breath. ¨ Nausea or vomiting. ¨ Pain, pressure, or a strange feeling in the back, neck, jaw, or upper belly or in one or both shoulders or arms. ¨ Lightheadedness or sudden weakness. ¨ A fast or irregular heartbeat. After you call 911, the  may tell you to chew 1 adult-strength or 2 to 4 low-dose aspirin. Wait for an ambulance. Do not try to drive yourself.  
  · You have symptoms of a stroke. These may include: 
¨ Sudden numbness, tingling, weakness, or loss of movement in your face, arm, or leg, especially on only one side of your body. ¨ Sudden vision changes. ¨ Sudden trouble speaking. ¨ Sudden confusion or trouble understanding simple statements. ¨ Sudden problems with walking or balance. ¨ A sudden, severe headache that is different from past headaches.  
  · You passed out (lost consciousness).  
 Call your doctor now or seek immediate medical care if: 
  · You have new or increased shortness of breath.   · You feel dizzy or lightheaded, or you feel like you may faint.  
  · Your heart rate becomes irregular.  
  · You can feel your heart flutter in your chest or skip heartbeats. Tell your doctor if these symptoms are new or worse.  
 Watch closely for changes in your health, and be sure to contact your doctor if you have any problems. Where can you learn more? Go to http://charan-melvin.info/. Enter U020 in the search box to learn more about \"Atrial Fibrillation: Care Instructions. \" Current as of: December 6, 2017 Content Version: 11.7 © 0352-5976 Bityota. Care instructions adapted under license by [employer] (which disclaims liability or warranty for this information). If you have questions about a medical condition or this instruction, always ask your healthcare professional. [address] any warranty or liability for your use of this information. Metoprolol (By mouth) Metoprolol (met-oh-PROE-lol) Treats high blood pressure, angina (chest pain), and heart failure. May lower the risk of death after a heart attack. This medicine is a beta-blocker. Brand Name(s): Lopressor, Toprol XL There may be other brand names for this medicine. When This Medicine Should Not Be Used: This medicine is not right for everyone. Do not use if you had an allergic reaction to metoprolol or similar medicines. Do not use this medicine if you have certain blood circulation or heart problems. Ask your doctor about these problems. How to Use This Medicine:  
Tablet, Long Acting Tablet · Take your medicine as directed. Your dose may need to be changed several times to find what works best for you. · Take this medicine with a meal or right after a meal. Take this medicine the same way every day, at the same time. · Swallow the tablet whole with a glass of water. You may break the extended-release tablet in half, but do not chew or crush it. · Missed dose: Take a dose as soon as you remember. If it is almost time for your next dose, wait until then and take a regular dose. Do not take extra medicine to make up for a missed dose. · Store the medicine in a closed container at room temperature, away from heat, moisture, and direct light. Drugs and Foods to Avoid: Ask your doctor or pharmacist before using any other medicine, including over-the-counter medicines, vitamins, and herbal products. · Some medicines can affect how metoprolol works. Tell your doctor if you are taking any of the following: ¨ Digoxin, dipyridamole, hydralazine, hydroxychloroquine, methyldopa, quinidine ¨ Medicine to treat depression (such as bupropion, clomipramine, desipramine, fluoxetine, fluvoxamine, paroxetine, sertraline), medicine to treat mental illness (such as chlorpromazine, fluphenazine, haloperidol, thioridazine), medicine for heart rhythm problems (such as propafenone), HIV/AIDS medicine (such as ritonavir), medicine to treat a fungus infection (such as terbinafine), a monoamine oxidase inhibitor (MAOI), an ergot medicine for headaches, a calcium channel blocker (such as amlodipine, diltiazem, verapamil), or an alpha blocker (such as clonidine, prazosin, reserpine, guanethidine) Warnings While Using This Medicine: · Tell your doctor if you are pregnant or breastfeeding, or if you have blood vessel, heart, or circulation problems (such as heart failure, rhythm problems, or slow heartbeat). Tell your doctor if you have kidney disease, liver disease, diabetes, lung disease (such as asthma), an overactive thyroid, or a history of allergies. · This medicine may cause worse symptoms of heart failure while the dose is being adjusted. · Do not stop using this medicine suddenly. Your doctor will need to slowly decrease your dose before you stop it completely.  
· Tell any doctor or dentist who treats you that you are using this medicine. You may need to stop using this medicine several days before you have surgery or medical tests. · This medicine could lower your blood pressure too much, especially when you first use it or if you are dehydrated. Stand or sit up slowly if you feel lightheaded or dizzy. · This medicine may make you dizzy or drowsy. Do not drive or do anything else that could be dangerous until you know how this medicine affects you. · Your doctor will check your progress and the effects of this medicine at regular visits. Keep all appointments. · Keep all medicine out of the reach of children. Never share your medicine with anyone. Possible Side Effects While Using This Medicine:  
Call your doctor right away if you notice any of these side effects: · Allergic reaction: Itching or hives, swelling in your face or hands, swelling or tingling in your mouth or throat, chest tightness, trouble breathing · Lightheadedness, dizziness, or fainting · Slow heartbeat · Swelling in your hands, ankles, or feet, trouble breathing, tiredness · Worsening chest pain If you notice these less serious side effects, talk with your doctor: · Diarrhea · Mild dizziness or tiredness If you notice other side effects that you think are caused by this medicine, tell your doctor. Call your doctor for medical advice about side effects. You may report side effects to FDA at 3-678-FDA-7665 © 2017 [address] Information is for End User's use only and may not be sold, redistributed or otherwise used for commercial purposes. The above information is an  only. It is not intended as medical advice for individual conditions or treatments. Talk to your doctor, nurse or pharmacist before following any medical regimen to see if it is safe and effective for you. Please provide this summary of care documentation to your next provider. Signatures-by signing, you are acknowledging that this After Visit Summary has been reviewed with you and you have received a copy. Patient Signature:  ____________________________________________________________ Date:  ____________________________________________________________  
  
[place] Provider Signature:  ____________________________________________________________ Date:  ____________________________________________________________

## 2018-09-04 NOTE — ED PROVIDER NOTES
EMERGENCY DEPARTMENT HISTORY AND PHYSICAL EXAM 
 
6:53 PM 
 
 
Date: 2018 Patient Name: Cristin Pinto History of Presenting Illness Chief Complaint Patient presents with  Chest Pain History Provided By: Patient Chief Complaint: chest pain Duration: 1 day Timing:  acute Location: right chest  
Quality: pressure, constant, radiating to her neck Severity: 8/10 Modifying Factors: laying down and deep inspirations exacerbates Associated Symptoms: hot flashes. Denies fevers, cough, vomiting, dysuria, bowel complications, abdominal pain Additional History (Context): Courtney Pinto is a 62 y.o. female  who presents with right sided, acute, chest pain that started yesterday. Patient rates a pain an 8/10 in severity and describes it as a constant pressure that radiates to her neck. She notes that laying down and deep inspirations exacerbate her pain. She also reports hot flashes. Denies any fevers, cough, vomiting, dysuria, bowel complications, abdominal pain. Pt notes that she has a history of thyroid complications and sometimes has an irregular heart rhythm because of her thyroid. No other complaints or concerns at this time. PCP: Janneth Parra MD 
 
 
 
Past History Past Medical History: 
Past Medical History:  
Diagnosis Date  Hypertension  Hypothyroid Past Surgical History: 
Past Surgical History:  
Procedure Laterality Date  HX  SECTION Family History: 
History reviewed. No pertinent family history. Social History: 
Social History Substance Use Topics  Smoking status: Never Smoker  Smokeless tobacco: Never Used  Alcohol use Yes Allergies: 
No Known Allergies Review of Systems Review of Systems Constitutional: Negative for fever.  
     (+) \"hot flashes\" HENT: Negative for trouble swallowing. Respiratory: Negative for cough and shortness of breath. Cardiovascular: Positive for chest pain. Gastrointestinal: Negative for abdominal pain, blood in stool, constipation, diarrhea and vomiting. Genitourinary: Negative for difficulty urinating and dysuria. Musculoskeletal: Negative for back pain and neck pain. Skin: Negative for wound. Neurological: Negative for syncope. Psychiatric/Behavioral: Negative for behavioral problems. All other systems reviewed and are negative. Physical Exam  
 
Visit Vitals  BP (!) 204/147  Pulse (!) 116  Temp 98.2 °F (36.8 °C)  Resp 21  
 Ht 5' 5\" (1.651 m)  Wt 108.9 kg (240 lb)  SpO2 100%  BMI 39.94 kg/m2 Physical Exam  
Constitutional: She is oriented to person, place, and time. She appears well-developed. No distress. well-appearing, nad HENT:  
Head: Normocephalic and atraumatic. Eyes: EOM are normal.  
Neck: Normal range of motion. Cardiovascular: Intact distal pulses. An irregularly irregular rhythm present. Tachycardia present. Pulmonary/Chest: Effort normal and breath sounds normal. No respiratory distress. Abdominal: Soft. There is no tenderness. Musculoskeletal: Normal range of motion. She exhibits no edema. Mechanically stable Neurological: She is alert and oriented to person, place, and time. No focal deficits noted Skin: Skin is warm. Psychiatric: Her behavior is normal.  
Nursing note and vitals reviewed. Diagnostic Study Results Labs - Recent Results (from the past 12 hour(s)) EKG, 12 LEAD, INITIAL Collection Time: 09/04/18  5:43 PM  
Result Value Ref Range Ventricular Rate 122 BPM  
 Atrial Rate 166 BPM  
 QRS Duration 78 ms Q-T Interval 342 ms QTC Calculation (Bezet) 487 ms Calculated R Axis 40 degrees Calculated T Axis 155 degrees Diagnosis Atrial fibrillation with rapid ventricular response ST & T wave abnormality, consider inferolateral ischemia or digitalis effect Abnormal ECG 
 No previous ECGs available CBC WITH AUTOMATED DIFF Collection Time: 09/04/18  6:15 PM  
Result Value Ref Range WBC 9.6 4.6 - 13.2 K/uL  
 RBC 4.19 (L) 4.20 - 5.30 M/uL  
 HGB 12.4 12.0 - 16.0 g/dL HCT 38.0 35.0 - 45.0 % MCV 90.7 74.0 - 97.0 FL  
 MCH 29.6 24.0 - 34.0 PG  
 MCHC 32.6 31.0 - 37.0 g/dL  
 RDW 14.3 11.6 - 14.5 % PLATELET 628 338 - 318 K/uL MPV 13.8 (H) 9.2 - 11.8 FL  
 NEUTROPHILS 68 40 - 73 % LYMPHOCYTES 23 21 - 52 % MONOCYTES 8 3 - 10 % EOSINOPHILS 1 0 - 5 % BASOPHILS 0 0 - 2 %  
 ABS. NEUTROPHILS 6.6 1.8 - 8.0 K/UL  
 ABS. LYMPHOCYTES 2.2 0.9 - 3.6 K/UL  
 ABS. MONOCYTES 0.7 0.05 - 1.2 K/UL  
 ABS. EOSINOPHILS 0.1 0.0 - 0.4 K/UL  
 ABS. BASOPHILS 0.0 0.0 - 0.1 K/UL  
 DF AUTOMATED METABOLIC PANEL, COMPREHENSIVE Collection Time: 09/04/18  6:15 PM  
Result Value Ref Range Sodium 143 136 - 145 mmol/L Potassium 3.3 (L) 3.5 - 5.5 mmol/L Chloride 106 100 - 108 mmol/L  
 CO2 30 21 - 32 mmol/L Anion gap 7 3.0 - 18 mmol/L Glucose 88 74 - 99 mg/dL BUN 12 7.0 - 18 MG/DL Creatinine 0.96 0.6 - 1.3 MG/DL  
 BUN/Creatinine ratio 13 12 - 20 GFR est AA >60 >60 ml/min/1.73m2 GFR est non-AA 60 (L) >60 ml/min/1.73m2 Calcium 8.6 8.5 - 10.1 MG/DL Bilirubin, total 0.6 0.2 - 1.0 MG/DL  
 ALT (SGPT) 83 (H) 13 - 56 U/L  
 AST (SGOT) 45 (H) 15 - 37 U/L Alk. phosphatase 137 (H) 45 - 117 U/L Protein, total 7.1 6.4 - 8.2 g/dL Albumin 3.6 3.4 - 5.0 g/dL Globulin 3.5 2.0 - 4.0 g/dL A-G Ratio 1.0 0.8 - 1.7    
TROPONIN I Collection Time: 09/04/18  6:15 PM  
Result Value Ref Range Troponin-I, Qt. 0.29 (H) 0.0 - 0.045 NG/ML  
TSH 3RD GENERATION Collection Time: 09/04/18  6:15 PM  
Result Value Ref Range TSH 0.12 (L) 0.36 - 3.74 uIU/mL T4, FREE Collection Time: 09/04/18  6:15 PM  
Result Value Ref Range T4, Free 1.1 0.7 - 1.5 NG/DL Radiologic Studies -  
XR CHEST PORT    (Results Pending) CXR interpreted by Dr. Fahad Barber at 7:30 PM and showed no acute process. Medical Decision Making I am the first provider for this patient. I reviewed the vital signs, available nursing notes, past medical history, past surgical history, family history and social history. Vital Signs-Reviewed the patient's vital signs. Pulse Oximetry Analysis - 99% (Interpretation) wnl 
 
Cardiac Monitor: 
Rate: 108 bpm  
Rhythm:  Irregular Records Reviewed: Nursing Notes (Time of Review: 6:53 PM) ED Course: Progress Notes, Reevaluation, and Consults: 
8:35 PM - Consult:  Discussed care with Dr. Jf Mayfield, Hospitalist. Standard discussion; including history of patients chief complaint, available diagnostic results, and treatment course. Will accept patient for admission. MDM Number of Diagnoses or Management Options Atrial fibrillation with RVR [facility]):  
Chest pain, unspecified type:  
Elevated troponin I level: Hypertensive emergency:  
Diagnosis management comments: 63 yo AAF with PMHx htn, hypothyroid presents with a couple days of right sided chest pain. No fevers, no vomiting, no diaphoresis. Examination with irregularly irregular tachycardia, but otherwise unremarkable. Will r/o acs and evaluate for acute process. 8:54 PM 
Trop somewhat elevated 0.29. Pt remains tachycardic in afib, but improved with diltiazem bolus and gtt. Pt also markedly hypertensive. I spoke with Dr. Jf Mayfield, hospitalist, who agreed to admit the pt. Updated pt on results and poc. Pt states she is feeling better after diltiazem. Amount and/or Complexity of Data Reviewed Clinical lab tests: ordered and reviewed Tests in the radiology section of CPT®: ordered and reviewed Review and summarize past medical records: yes Independent visualization of images, tracings, or specimens: yes Risk of Complications, Morbidity, and/or Mortality Presenting problems: high Diagnostic procedures: high Management options: high Critical Care Total time providing critical care: 30-74 minutes Patient Progress Patient progress: stable EKG Date/Time: 9/4/2018 7:02 PM 
Performed by: Gregorio Dillard Authorized by: Gregorio Dillard  
 
ECG reviewed by ED Physician in the absence of a cardiologist: yes Previous ECG:  
  Previous ECG:  Unavailable Interpretation: Interpretation: abnormal   
Rate:  
  ECG rate:  122 ECG rate assessment: tachycardic Rhythm:  
  Rhythm: atrial fibrillation Ectopy:  
  Ectopy: none QRS:  
  QRS axis:  Normal 
  QRS intervals:  Normal 
Conduction:  
  Conduction: normal   
ST segments: ST segments:  Normal 
T waves:  
  T waves: non-specific CRITICAL CARE (ASAP ONLY) Performed by: Gregorio Dillard Authorized by: Gregorio Dillard Critical care provider statement:  
  Critical care time (minutes):  60 Critical care time was exclusive of:  Separately billable procedures and treating other patients Critical care was necessary to treat or prevent imminent or life-threatening deterioration of the following conditions:  Cardiac failure and circulatory failure Critical care was time spent personally by me on the following activities:  Ordering and performing treatments and interventions, ordering and review of laboratory studies, ordering and review of radiographic studies, pulse oximetry, re-evaluation of patient's condition, review of old charts, obtaining history from patient or surrogate, examination of patient, evaluation of patient's response to treatment, discussions with consultants and development of treatment plan with patient or surrogate Critical Care Time: 60 minutes For Hospitalized Patients: 
 
1. Hospitalization Decision Time: The decision to hospitalize the patient was made by Dr. Elise Marie at 8:35 PM on 9/4/2018 2. Aspirin: Aspirin was given Diagnosis Clinical Impression: 1. Atrial fibrillation with RVR ([facility] [address] 2. Elevated troponin I level 3. Chest pain, unspecified type 4. Hypertensive emergency Disposition: Admit Follow-up Information None Patient's Medications No medications on file  
 
_______________________________ Attestations: 
Scribe Attestation Camden Brunson acting as a scribe for and in the presence of Mili Chiang MD 
    
September 04, 2018 at 6:53 PM 
    
Provider Attestation:     
I personally performed the services described in the documentation, reviewed the documentation, as recorded by the scribe in my presence, and it accurately and completely records my words and actions. September 04, 2018 at 6:53 PM Mili Chiang MD 
_______________________________

## 2018-09-05 ENCOUNTER — APPOINTMENT (OUTPATIENT)
Dept: NON INVASIVE DIAGNOSTICS | Age: 58
End: 2018-09-05
Attending: HOSPITALIST
Payer: SELF-PAY

## 2018-09-05 VITALS
WEIGHT: 240 LBS | BODY MASS INDEX: 39.99 KG/M2 | HEIGHT: 65 IN | TEMPERATURE: 98 F | RESPIRATION RATE: 18 BRPM | OXYGEN SATURATION: 95 % | SYSTOLIC BLOOD PRESSURE: 145 MMHG | HEART RATE: 85 BPM | DIASTOLIC BLOOD PRESSURE: 83 MMHG

## 2018-09-05 PROBLEM — I10 HTN (HYPERTENSION): Chronic | Status: ACTIVE | Noted: 2018-09-05

## 2018-09-05 PROBLEM — E03.9 ACQUIRED HYPOTHYROIDISM: Chronic | Status: ACTIVE | Noted: 2018-09-05

## 2018-09-05 LAB
ALBUMIN SERPL-MCNC: 3.4 G/DL (ref 3.4–5)
ALBUMIN/GLOB SERPL: 1 {RATIO} (ref 0.8–1.7)
ALP SERPL-CCNC: 122 U/L (ref 45–117)
ALT SERPL-CCNC: 68 U/L (ref 13–56)
ANION GAP SERPL CALC-SCNC: 9 MMOL/L (ref 3–18)
APTT PPP: 136.9 SEC (ref 23–36.4)
APTT PPP: >180 SEC (ref 23–36.4)
AST SERPL-CCNC: 34 U/L (ref 15–37)
ATRIAL RATE: 166 BPM
BILIRUB SERPL-MCNC: 1 MG/DL (ref 0.2–1)
BUN SERPL-MCNC: 9 MG/DL (ref 7–18)
BUN/CREAT SERPL: 12 (ref 12–20)
CALCIUM SERPL-MCNC: 8.4 MG/DL (ref 8.5–10.1)
CALCULATED R AXIS, ECG10: 40 DEGREES
CALCULATED T AXIS, ECG11: 155 DEGREES
CHLORIDE SERPL-SCNC: 108 MMOL/L (ref 100–108)
CHOLEST SERPL-MCNC: 204 MG/DL
CO2 SERPL-SCNC: 28 MMOL/L (ref 21–32)
CREAT SERPL-MCNC: 0.77 MG/DL (ref 0.6–1.3)
DIAGNOSIS, 93000: NORMAL
ECHO AV PEAK GRADIENT: 0 MMHG
ECHO AV PEAK VELOCITY: 0 CM/S
ECHO EST RA PRESSURE: 10 MMHG
ECHO LA VOL 2C: 100.64 ML (ref 22–52)
ECHO LA VOL 4C: 106.05 ML (ref 22–52)
ECHO LA VOLUME INDEX A2C: 47.08 ML/M2
ECHO LA VOLUME INDEX A4C: 49.61 ML/M2
ECHO LV EDV A2C: 116.1 ML
ECHO LV EDV A4C: 123.7 ML
ECHO LV EDV BP: 123.6 ML (ref 56–104)
ECHO LV EDV INDEX A4C: 57.9 ML/M2
ECHO LV EDV INDEX BP: 57.8 ML/M2
ECHO LV EDV NDEX A2C: 54.3 ML/M2
ECHO LV EJECTION FRACTION A2C: 64 %
ECHO LV EJECTION FRACTION A4C: 54 %
ECHO LV EJECTION FRACTION BIPLANE: 59.1 % (ref 55–100)
ECHO LV ESV A2C: 41.5 ML
ECHO LV ESV A4C: 57.6 ML
ECHO LV ESV BP: 50.6 ML (ref 19–49)
ECHO LV ESV INDEX A2C: 19.4 ML/M2
ECHO LV ESV INDEX A4C: 26.9 ML/M2
ECHO LV ESV INDEX BP: 23.7 ML/M2
ECHO LV INTERNAL DIMENSION DIASTOLIC: 3.79 CM (ref 3.9–5.3)
ECHO LV INTERNAL DIMENSION SYSTOLIC: 2.65 CM
ECHO LV ISOVOLUMETRIC RELAXATION TIME (IVRT): 49.9 MS
ECHO LV IVSD: 1.5 CM (ref 0.6–0.9)
ECHO LV MASS 2D: 255.8 G (ref 67–162)
ECHO LV MASS INDEX 2D: 119.7 G/M2
ECHO LV POSTERIOR WALL DIASTOLIC: 1.5 CM (ref 0.6–0.9)
ECHO LVOT DIAM: 1.95 CM
ECHO LVOT PEAK GRADIENT: 1.5 MMHG
ECHO LVOT PEAK VELOCITY: 60.23 CM/S
ECHO PULMONARY ARTERY SYSTOLIC PRESSURE (PASP): 21.3 MMHG
ECHO RIGHT VENTRICULAR SYSTOLIC PRESSURE (RVSP): 21.3 MMHG
ECHO TV REGURGITANT MAX VELOCITY: -167.83 CM/S
ECHO TV REGURGITANT PEAK GRADIENT: 11.3 MMHG
ERYTHROCYTE [DISTWIDTH] IN BLOOD BY AUTOMATED COUNT: 14.2 % (ref 11.6–14.5)
GLOBULIN SER CALC-MCNC: 3.3 G/DL (ref 2–4)
GLUCOSE SERPL-MCNC: 87 MG/DL (ref 74–99)
HCT VFR BLD AUTO: 37.3 % (ref 35–45)
HDLC SERPL-MCNC: 54 MG/DL (ref 40–60)
HDLC SERPL: 3.8 {RATIO} (ref 0–5)
HGB BLD-MCNC: 12.3 G/DL (ref 12–16)
LDLC SERPL CALC-MCNC: 131.8 MG/DL (ref 0–100)
LIPID PROFILE,FLP: ABNORMAL
MAGNESIUM SERPL-MCNC: 1.5 MG/DL (ref 1.6–2.6)
MCH RBC QN AUTO: 30.3 PG (ref 24–34)
MCHC RBC AUTO-ENTMCNC: 33 G/DL (ref 31–37)
MCV RBC AUTO: 91.9 FL (ref 74–97)
PLATELET # BLD AUTO: 154 K/UL (ref 135–420)
PMV BLD AUTO: 13.5 FL (ref 9.2–11.8)
POTASSIUM SERPL-SCNC: 3 MMOL/L (ref 3.5–5.5)
PROT SERPL-MCNC: 6.7 G/DL (ref 6.4–8.2)
Q-T INTERVAL, ECG07: 342 MS
QRS DURATION, ECG06: 78 MS
QTC CALCULATION (BEZET), ECG08: 487 MS
RBC # BLD AUTO: 4.06 M/UL (ref 4.2–5.3)
SODIUM SERPL-SCNC: 145 MMOL/L (ref 136–145)
TRIGL SERPL-MCNC: 91 MG/DL (ref ?–150)
TROPONIN I SERPL-MCNC: 0.24 NG/ML (ref 0–0.04)
TROPONIN I SERPL-MCNC: 0.26 NG/ML (ref 0–0.04)
VENTRICULAR RATE, ECG03: 122 BPM
VLDLC SERPL CALC-MCNC: 18.2 MG/DL
WBC # BLD AUTO: 10.9 K/UL (ref 4.6–13.2)

## 2018-09-05 PROCEDURE — 93306 TTE W/DOPPLER COMPLETE: CPT

## 2018-09-05 PROCEDURE — 74011250636 HC RX REV CODE- 250/636: Performed by: HOSPITALIST

## 2018-09-05 PROCEDURE — 84484 ASSAY OF TROPONIN QUANT: CPT | Performed by: HOSPITALIST

## 2018-09-05 PROCEDURE — 74011000258 HC RX REV CODE- 258: Performed by: HOSPITALIST

## 2018-09-05 PROCEDURE — 99218 HC RM OBSERVATION: CPT

## 2018-09-05 PROCEDURE — 80061 LIPID PANEL: CPT | Performed by: HOSPITALIST

## 2018-09-05 PROCEDURE — 83735 ASSAY OF MAGNESIUM: CPT | Performed by: HOSPITALIST

## 2018-09-05 PROCEDURE — 74011250637 HC RX REV CODE- 250/637: Performed by: HOSPITALIST

## 2018-09-05 PROCEDURE — 36415 COLL VENOUS BLD VENIPUNCTURE: CPT | Performed by: HOSPITALIST

## 2018-09-05 PROCEDURE — 85730 THROMBOPLASTIN TIME PARTIAL: CPT | Performed by: HOSPITALIST

## 2018-09-05 PROCEDURE — 74011250637 HC RX REV CODE- 250/637: Performed by: EMERGENCY MEDICINE

## 2018-09-05 PROCEDURE — 85027 COMPLETE CBC AUTOMATED: CPT | Performed by: HOSPITALIST

## 2018-09-05 PROCEDURE — 74011250637 HC RX REV CODE- 250/637: Performed by: NURSE PRACTITIONER

## 2018-09-05 PROCEDURE — 80053 COMPREHEN METABOLIC PANEL: CPT | Performed by: HOSPITALIST

## 2018-09-05 RX ORDER — LEVOTHYROXINE SODIUM 50 UG/1
100 TABLET ORAL
Status: DISCONTINUED | OUTPATIENT
Start: 2018-09-05 | End: 2018-09-05 | Stop reason: HOSPADM

## 2018-09-05 RX ORDER — METOPROLOL TARTRATE 25 MG/1
25 TABLET, FILM COATED ORAL EVERY 12 HOURS
Status: DISCONTINUED | OUTPATIENT
Start: 2018-09-05 | End: 2018-09-05 | Stop reason: HOSPADM

## 2018-09-05 RX ORDER — GUAIFENESIN 100 MG/5ML
81 LIQUID (ML) ORAL DAILY
Qty: 30 TAB | Refills: 0 | Status: SHIPPED | OUTPATIENT
Start: 2018-09-06 | End: 2019-12-25

## 2018-09-05 RX ORDER — METOPROLOL TARTRATE 25 MG/1
12.5 TABLET, FILM COATED ORAL EVERY 12 HOURS
Status: DISCONTINUED | OUTPATIENT
Start: 2018-09-05 | End: 2018-09-05

## 2018-09-05 RX ORDER — LISINOPRIL 20 MG/1
20 TABLET ORAL DAILY
Status: DISCONTINUED | OUTPATIENT
Start: 2018-09-05 | End: 2018-09-05 | Stop reason: HOSPADM

## 2018-09-05 RX ORDER — METOPROLOL TARTRATE 25 MG/1
25 TABLET, FILM COATED ORAL EVERY 12 HOURS
Qty: 60 TAB | Refills: 0 | Status: SHIPPED | OUTPATIENT
Start: 2018-09-05 | End: 2019-12-25

## 2018-09-05 RX ORDER — ATORVASTATIN CALCIUM 40 MG/1
40 TABLET, FILM COATED ORAL DAILY
Qty: 30 TAB | Refills: 0 | Status: SHIPPED | OUTPATIENT
Start: 2018-09-06

## 2018-09-05 RX ORDER — POTASSIUM CHLORIDE 20 MEQ/1
40 TABLET, EXTENDED RELEASE ORAL 2 TIMES DAILY
Status: DISCONTINUED | OUTPATIENT
Start: 2018-09-05 | End: 2018-09-05 | Stop reason: HOSPADM

## 2018-09-05 RX ORDER — MAGNESIUM SULFATE HEPTAHYDRATE 40 MG/ML
2 INJECTION, SOLUTION INTRAVENOUS ONCE
Status: COMPLETED | OUTPATIENT
Start: 2018-09-05 | End: 2018-09-05

## 2018-09-05 RX ORDER — NITROGLYCERIN 0.4 MG/1
0.4 TABLET SUBLINGUAL AS NEEDED
Status: DISCONTINUED | OUTPATIENT
Start: 2018-09-05 | End: 2018-09-05 | Stop reason: HOSPADM

## 2018-09-05 RX ADMIN — LISINOPRIL 20 MG: 20 TABLET ORAL at 09:00

## 2018-09-05 RX ADMIN — ACETAMINOPHEN 650 MG: 325 TABLET, FILM COATED ORAL at 04:56

## 2018-09-05 RX ADMIN — ASPIRIN 81 MG 81 MG: 81 TABLET ORAL at 09:00

## 2018-09-05 RX ADMIN — SODIUM CHLORIDE 5 MG/HR: 900 INJECTION, SOLUTION INTRAVENOUS at 11:13

## 2018-09-05 RX ADMIN — METOPROLOL TARTRATE 12.5 MG: 25 TABLET ORAL at 13:22

## 2018-09-05 RX ADMIN — POTASSIUM CHLORIDE 40 MEQ: 20 TABLET, EXTENDED RELEASE ORAL at 09:00

## 2018-09-05 RX ADMIN — NITROGLYCERIN 0.4 MG: 0.4 TABLET SUBLINGUAL at 00:54

## 2018-09-05 RX ADMIN — MAGNESIUM SULFATE HEPTAHYDRATE 2 G: 40 INJECTION, SOLUTION INTRAVENOUS at 11:14

## 2018-09-05 RX ADMIN — LEVOTHYROXINE SODIUM 100 MCG: 50 TABLET ORAL at 05:55

## 2018-09-05 RX ADMIN — SODIUM CHLORIDE 75 ML/HR: 900 INJECTION, SOLUTION INTRAVENOUS at 11:19

## 2018-09-05 RX ADMIN — NITROGLYCERIN 0.4 MG: 0.4 TABLET SUBLINGUAL at 01:26

## 2018-09-05 NOTE — ED NOTES
TRANSFER - ED to INPATIENT REPORT: 
 
SBAR report made available to receiving floor on this patient being transferred to [address] [place] [address] ([facility])  for routine progression of care Admitting diagnosis Atrial fibrillation with RVR ([facility] [address] Atypical chest pain Information from the following report(s) SBAR, ED Summary, MAR, Recent Results and Cardiac Rhythm afib was made available to receiving floor. Lines:  
Peripheral IV 09/04/18 Right Hand (Active) Site Assessment Clean, dry, & intact 9/4/2018  6:15 PM  
Phlebitis Assessment 0 9/4/2018  6:15 PM  
Infiltration Assessment 0 9/4/2018  6:15 PM  
Dressing Status Clean, dry, & intact 9/4/2018  6:15 PM  
Hub Color/Line Status Pink 9/4/2018  6:15 PM  
  
 
Medication list confirmed with patient Opportunity for questions and clarification was provided. Patient is oriented to time, place, person and situation High alert med Patient is  continent and ambulatory without assist 
  
Valuables given to family at patients request  
 
Patient transported with: 
 Monitor Registered Nurse

## 2018-09-05 NOTE — PROGRESS NOTES
Problem: Afib Pathway: Day 1 Goal: *Stable cardiac rhythm Outcome: Not Progressing Towards Goal 
Uncontrolled Afib; on IV Cardizem and Heparin Problem: Falls - Risk of 
Goal: *Absence of Falls Document Awilda Miller Fall Risk and appropriate interventions in the flowsheet. Outcome: Progressing Towards Goal 
Fall Risk Interventions: 
  
Call for assistance as needed. Purposeful hourly rounds

## 2018-09-05 NOTE — PROGRESS NOTES
2156  Pt arrived on floor from ER by stretcher, and ambulated to bathroom then bed. Pt AOx4. Pt oriented to room, pt verbalizes understanding. Cardizem infusion verified. Pt denies CP at this time. Call light in reach, bed in low position. 2222  Heparin bolus given, and heparin infusion started. 0054  Pt given last ordered nitro for CP. 2L NC applied 1988  Notified Dr Janny Willett of pt's CP. New PRN nitro orders placed 0126  Pt given nitro for CP 
 
0456  Pt medicated for headache. Pt denies CP since last nitro given 3478  Stopped heparin infusion for critical high result 
 
0656  Informed Dr Janny Willett of pt's potassium; PO replacement ordered. Discussed BP management; no new orders. Purposeful hourly rounds completed. Patient Vitals for the past 8 hrs: 
 Temp Pulse Resp BP SpO2  
09/05/18 0459 98.1 °F (36.7 °C) 88 20 (!) 147/123 100 % 09/05/18 0132 - 85 - (!) 154/114 -  
09/05/18 0126 - 71 - (!) 158/116 -  
09/05/18 0054 - 94 - (!) 189/121 -  
09/04/18 2341 98.1 °F (36.7 °C) 72 18 (!) 168/107 99 % 2782  Restarted heparin. Bedside and Verbal shift change report given to Priya Rebollar RN by Satya Hutchinson RN. Report included the following information SBAR, Kardex, Intake/Output and MAR.

## 2018-09-05 NOTE — ED NOTES
Spoke with Vanna Seymour on [facility] at this time and notified patient needs a new PT/INR drawn before able to start heparin drip and will draw blood work down here prior to transporting patient upstairs and heparin drip to be started on floor. Annika Lopez RN verbalized agreement and understanding

## 2018-09-05 NOTE — MED STUDENT NOTES
*ATTENTION:  This note has been created by a medical student for educational purposes only. Please do not refer to the content of this note for clinical decision-making, billing, or other purposes. Please see attending physicians note to obtain clinical information on this patient. * *ATTENTION:  This note has been created by a medical student for educational purposes only. Please do not refer to the content of this note for clinical decision-making, billing, or other purposes. Please see attending physicians note to obtain clinical information on this patient. * Student Progress Note Please refer to attendings daily rounding note for full details Patient: Romelia Nuñez MRN: [id number]  CSN: [id number] YOB: 1960  Age: 62 y.o. Sex: female DOA: 2018 LOS:  LOS: 1 day Chief Complaint:  Chest pain Subjective:  
 Ms. Denita Sever a 62year old  female presented on  with chest pain and shortness of breath. The chest pain is a pressure and sore sensation in the right side of her chest radiating to the right side of her neck and her right shoulder and upper extremity. She has never experienced this pain before. She was also experiencing shortness of breath with the chest pain. The patient said the chest pain was a 10/10 upon arrival to the ED. The pain is worse when she would lay down especially on her right side. She tried taking medications to relieve gas without relief. The pain and shortness of breath started Monday and progressively worsened until she went to the ED. She denies fever, chills, nausea, vomiting and diarrhea. She has a PMH of HTN, hypothyroid, and subarachnoid hemorrhage. She currently takes lisinopril 20 mg 1x day po and levothyroxine 100 mcg 1x day po before bed. Upon admission she was given cardizem gtt, ASA, and statins. She has a PSH of  section. She is , denies smoking and drug use, and drinks alcohol. She does not have a pertinent family history. Today she noted that she is feeling much better than she was yesterday. She has had occasional chest pain and shortness of breath but it is far less severe. ROS:  
Gen: negative weakness, fever, chills, sweats, change in appetite Head: positive headache, negative dizziness and lightheadedness Eyes: negative change in vision, scotoma, blurred vision, double vision Nose: negative rhinorrhea, congestion Throat/mouth/pharynx: positive sore throat Respiratory: negative cough, sputum, pleuritic chest pain, positive occasional shortness of breath CV: positive occasional chest pain, negative palpitations GI: negative nausea, vomiting, dysphagia, positive no bowel movements Urinary: negative frequency, urgency, incontinence MSK: negative myalgia, arthralgia Psych: negative nervousness, anxiousness Objective:  
  
Visit Vitals  BP (!) 150/103  Pulse 84  Temp 97.4 °F (36.3 °C)  Resp 20  
 Ht 5' 5\" (1.651 m)  Wt 108.9 kg (240 lb)  SpO2 91%  BMI 39.94 kg/m2 Physical Exam: 
Gen: awake, alert, cooperative, no distresss HEENT: Head normocephalic and atraumatic. Sclera anicteric. Trachea midline. Nares patent. Oropharynx without erythema or exudates. CV: Irregular heart rate and rhythm. Resp: Lungs CTA in all lung fields without wheezes, rales or rhonchi. Abd: soft, non-distended, and non-tender. Normoactive bowel sounds. Extrem:  Extremities normal, atraumatic, no cyanosis. Skin: skin color, texture and turgor normal. 
Neuro: appropriate behavior and following commands. Sensation to light touch intact throughout b/l upper extremities. UE strength 5/5 b/l. I have reviewed the patient's Labs and Radiology studies. Assessment:  
A/P: 
 
Afib with RVR 
-cardizem 
-withhold anticoagulation due to prior subarachnoid hemorrhage -echo to look for clot in right atrial appendage Atypical chest pain 
-recheck cardiac enzymes 
-ASA, statin Hypothyroid 
-continue levothyroxine HTN 
-continue lisinopril Plan: Jeff Leslie 9/5/2018 
10:16 AM

## 2018-09-05 NOTE — DISCHARGE INSTRUCTIONS
FOLLOW UP VISIT Appointment in: One Week Please follow up with your PCP within 7 days to discuss your recent hospitalization. Patient to arrange. Follow up with cardiology in 2-3 weeks. Patient to arrange. DISCHARGE SUMMARY from Nurse    PATIENT INSTRUCTIONS:    After general anesthesia or intravenous sedation, for 24 hours or while taking prescription Narcotics:  · Limit your activities  · Do not drive and operate hazardous machinery  · Do not make important personal or business decisions  · Do  not drink alcoholic beverages  · If you have not urinated within 8 hours after discharge, please contact your surgeon on call. Report the following to your surgeon:  · Excessive pain, swelling, redness or odor of or around the surgical area  · Temperature over 100.5  · Nausea and vomiting lasting longer than 4 hours or if unable to take medications  · Any signs of decreased circulation or nerve impairment to extremity: change in color, persistent  numbness, tingling, coldness or increase pain  · Any questions    What to do at Home:  Recommended activity: Activity as tolerated,     If you experience any of the following symptoms of chest pain, shortness of breath, rapid heart beat, or any other concerns, please follow up with primary care physician. *  Please give a list of your current medications to your Primary Care Provider. *  Please update this list whenever your medications are discontinued, doses are      changed, or new medications (including over-the-counter products) are added. *  Please carry medication information at all times in case of emergency situations. These are general instructions for a healthy lifestyle:    No smoking/ No tobacco products/ Avoid exposure to second hand smoke  Surgeon General's Warning:  Quitting smoking now greatly reduces serious risk to your health.     Obesity, smoking, and sedentary lifestyle greatly increases your risk for illness    A healthy diet, regular physical exercise & weight monitoring are important for maintaining a healthy lifestyle    You may be retaining fluid if you have a history of heart failure or if you experience any of the following symptoms:  Weight gain of 3 pounds or more overnight or 5 pounds in a week, increased swelling in our hands or feet or shortness of breath while lying flat in bed. Please call your doctor as soon as you notice any of these symptoms; do not wait until your next office visit. Recognize signs and symptoms of STROKE:    F-face looks uneven    A-arms unable to move or move unevenly    S-speech slurred or non-existent    T-time-call 911 as soon as signs and symptoms begin-DO NOT go       Back to bed or wait to see if you get better-TIME IS BRAIN. Warning Signs of HEART ATTACK     Call 911 if you have these symptoms:   Chest discomfort. Most heart attacks involve discomfort in the center of the chest that lasts more than a few minutes, or that goes away and comes back. It can feel like uncomfortable pressure, squeezing, fullness, or pain.  Discomfort in other areas of the upper body. Symptoms can include pain or discomfort in one or both arms, the back, neck, jaw, or stomach.  Shortness of breath with or without chest discomfort.  Other signs may include breaking out in a cold sweat, nausea, or lightheadedness. Don't wait more than five minutes to call 911 - MINUTES MATTER! Fast action can save your life. Calling 911 is almost always the fastest way to get lifesaving treatment. Emergency Medical Services staff can begin treatment when they arrive -- up to an hour sooner than if someone gets to the hospital by car. The discharge information has been reviewed with the patient. The patient verbalized understanding.   Discharge medications reviewed with the patient and appropriate educational materials and side effects teaching were provided. ___________________________________________________________________________________________________________________________________    Patient armband removed and shredded. MyChart Activation    Thank you for enrolling in [address] Please follow the instructions below to securely access your online medical record. [employer] allows you to send messages to your doctor, view your test results, renew your prescriptions, schedule appointments, and more. How Do I Sign Up? 1. In your internet browser, go to [email or web address]. 2. Click on the First Time User? Click Here link in the Sign In box. You will see the New Member Sign Up page. 3. Enter your [employer] Access Code exactly as it appears below. You will not need to use this code after youve completed the sign-up process. If you do not sign up before the expiration date, you must request a new code. [employer] Access Code: [email or web address]  Expires: 12/3/2018  5:37 PM     4. Enter the last four digits of your Social Security Number (xxxx) and Date of Birth (mm/dd/yyyy) as indicated and click Submit. You will be taken to the next sign-up page. 5. Create a magnetUt ID. This will be your [employer] login ID and cannot be changed, so think of one that is secure and easy to remember. 6. Create a [employer] password. You can change your password at any time. 7. Enter your Password Reset Question and Answer. This can be used at a later time if you forget your password. 8. Enter your e-mail address. You will receive e-mail notification when new information is available in [address] 9. Click Sign Up. You can now view your medical record. Additional Information    Remember, [employer] is NOT to be used for urgent needs. For medical emergencies, dial 911. Now available from your iPhone and Android! Atrial Fibrillation: Care Instructions  Your Care Instructions    Atrial fibrillation is an irregular and often fast heartbeat. Treating this condition is important for several reasons. It can cause blood clots, which can travel from your heart to your brain and cause a stroke. If you have a fast heartbeat, you may feel lightheaded, dizzy, and weak. An irregular heartbeat can also increase your risk for heart failure. Atrial fibrillation is often the result of another heart condition, such as high blood pressure or coronary artery disease. Making changes to improve your heart condition will help you stay healthy and active. Follow-up care is a key part of your treatment and safety. Be sure to make and go to all appointments, and call your doctor if you are having problems. It's also a good idea to know your test results and keep a list of the medicines you take. How can you care for yourself at home? Medicines    · Take your medicines exactly as prescribed. Call your doctor if you think you are having a problem with your medicine. You will get more details on the specific medicines your doctor prescribes.     · If your doctor has given you a blood thinner to prevent a stroke, be sure you get instructions about how to take your medicine safely. Blood thinners can cause serious bleeding problems.     · Do not take any vitamins, over-the-counter drugs, or herbal products without talking to your doctor first.    Lifestyle changes    · Do not smoke. Smoking can increase your chance of a stroke and heart attack. If you need help quitting, talk to your doctor about stop-smoking programs and medicines. These can increase your chances of quitting for good.     · Eat a heart-healthy diet.     · Stay at a healthy weight. Lose weight if you need to.     · Limit alcohol to 2 drinks a day for men and 1 drink a day for women. Too much alcohol can cause health problems.     · Avoid colds and flu. Get a pneumococcal vaccine shot. If you have had one before, ask your doctor whether you need another dose. Get a flu shot every year.  If you must be around people with colds or flu, wash your hands often. Activity    · If your doctor recommends it, get more exercise. Walking is a good choice. Bit by bit, increase the amount you walk every day. Try for at least 30 minutes on most days of the week. You also may want to swim, bike, or do other activities. Your doctor may suggest that you join a cardiac rehabilitation program so that you can have help increasing your physical activity safely.     · Start light exercise if your doctor says it is okay. Even a small amount will help you get stronger, have more energy, and manage stress. Walking is an easy way to get exercise. Start out by walking a little more than you did in the hospital. Gradually increase the amount you walk.     · When you exercise, watch for signs that your heart is working too hard. You are pushing too hard if you cannot talk while you are exercising. If you become short of breath or dizzy or have chest pain, sit down and rest immediately.     · Check your pulse regularly. Place two fingers on the artery at the palm side of your wrist, in line with your thumb. If your heartbeat seems uneven or fast, talk to your doctor. When should you call for help? Call 911 anytime you think you may need emergency care. For example, call if:    · You have symptoms of a heart attack. These may include:  ¨ Chest pain or pressure, or a strange feeling in the chest.  ¨ Sweating. ¨ Shortness of breath. ¨ Nausea or vomiting. ¨ Pain, pressure, or a strange feeling in the back, neck, jaw, or upper belly or in one or both shoulders or arms. ¨ Lightheadedness or sudden weakness. ¨ A fast or irregular heartbeat. After you call 911, the  may tell you to chew 1 adult-strength or 2 to 4 low-dose aspirin. Wait for an ambulance. Do not try to drive yourself.     · You have symptoms of a stroke.  These may include:  ¨ Sudden numbness, tingling, weakness, or loss of movement in your face, arm, or leg, especially on only one side of your body. ¨ Sudden vision changes. ¨ Sudden trouble speaking. ¨ Sudden confusion or trouble understanding simple statements. ¨ Sudden problems with walking or balance. ¨ A sudden, severe headache that is different from past headaches.     · You passed out (lost consciousness).    Call your doctor now or seek immediate medical care if:    · You have new or increased shortness of breath.     · You feel dizzy or lightheaded, or you feel like you may faint.     · Your heart rate becomes irregular.     · You can feel your heart flutter in your chest or skip heartbeats. Tell your doctor if these symptoms are new or worse.    Watch closely for changes in your health, and be sure to contact your doctor if you have any problems. Where can you learn more? Go to http://charan-melvin.info/. Enter U020 in the search box to learn more about \"Atrial Fibrillation: Care Instructions. \"  Current as of: December 6, 2017  Content Version: 11.7  © 3670-5036 [employer]. Care instructions adapted under license by [employer] (which disclaims liability or warranty for this information). If you have questions about a medical condition or this instruction, always ask your healthcare professional. [place] [address] any warranty or liability for your use of this information. Learning About Atrial Fibrillation  What is atrial fibrillation? Atrial fibrillation (say \"AY-tree-hillary doj-rdjj-EEM-shun\") is the most common type of irregular heartbeat (arrhythmia). Normally, the heart beats in a strong, steady rhythm. In atrial fibrillation, a problem with the heart's electrical system causes the two upper parts of the heart (the atria) to quiver, or fibrillate. Your heart rate also may be faster than normal.  Atrial fibrillation can be dangerous because if the heartbeat isn't strong and steady, blood can collect, or pool, in the atria.  And pooled blood is more likely to form clots. Clots can travel to the brain, block blood flow, and cause a stroke. Atrial fibrillation can also lead to heart failure. Treatment for atrial fibrillation helps prevent stroke and heart failure. It also helps relieve symptoms. Atrial fibrillation is often caused by another heart problem. It may happen after heart surgery. It may also be caused by other problems, such as an overactive thyroid gland or lung disease. Many people with atrial fibrillation are able to live full and active lives. What are the symptoms? Some people feel symptoms when they have episodes of atrial fibrillation. But other people don't notice any symptoms. If you have symptoms, you may feel:  · A fluttering, racing, or pounding feeling in your chest called palpitations. · Weak or tired. · Dizzy or lightheaded. · Short of breath. · Chest pain. · Confused. You may notice signs of atrial fibrillation when you check your pulse. Your pulse may seem uneven or fast.  What can you expect when you have atrial fibrillation? At first, spells of atrial fibrillation may come on suddenly and last a short time. It may go away on its own or it goes away after treatment. This is called paroxysmal atrial fibrillation. Over time, the spells may last longer and occur more often. They often don't go away on their own. How is it treated? Treatments can help you feel better and prevent future problems, especially stroke and heart failure. The main types of treatment slow the heart rate, control the heart rhythm, and help prevent stroke. Your treatment will depend on the cause of your atrial fibrillation, your symptoms, and your risk for stroke. · Heart rate treatment. Medicine may be used to slow your heart rate. Your heartbeat may still be irregular. But these medicines keep your heart from beating too fast. They may also help relieve your symptoms. · Heart rhythm treatment.  Different treatments may be used to try to stop atrial fibrillation and keep it from returning. They can also relieve symptoms. These treatments include medicine, electrical cardioversion to shock the heart back to a normal rhythm, a procedure called catheter ablation, and heart surgery. · Stroke prevention. You and your doctor can decide how to lower your risk. You may decide to take a blood-thinning medicine, such as aspirin or an anticoagulant. How can you live well with it? You can live well and help manage atrial fibrillation by having a heart-healthy lifestyle. To have a heart-healthy lifestyle:  · Don't smoke. · Eat heart-healthy foods. · Be active. Talk to your doctor about what type and level of exercise is safe for you. · Stay at a healthy weight. Lose weight if you need to. · Manage stress. · Avoid alcohol if it triggers symptoms. · Manage other health problems such as high blood pressure, high cholesterol, and diabetes. · Avoid getting sick from the flu. Get a flu shot every year. Where can you learn more? Go to http://charanSlimTradermelvin.info/. Enter [phone] in the search box to learn more about \"Learning About Atrial Fibrillation. \"  Current as of: December 6, 2017  Content Version: 11.7  © 6467-9118 [employer]. Care instructions adapted under license by [employer] (which disclaims liability or warranty for this information). If you have questions about a medical condition or this instruction, always ask your healthcare professional. [place] [address] any warranty or liability for your use of this information. Deciding Between Electrical Cardioversion and Rate Control Medicines for Atrial Fibrillation  Deciding Between Electrical Cardioversion and Rate Control Medicines for Atrial Fibrillation    What is atrial fibrillation? Atrial fibrillation (say \"AY-tree-hillary fwi-ofen-ISU-shun\") is a kind of uneven heartbeat. It can make you feel lightheaded and dizzy.  You may feel weak. It also can make you more likely to have a stroke. Electrical cardioversion can return your heart to a normal rhythm. First you'll get medicines to make you sleepy and control pain. Then your doctor will use patches to send an electric current to your heart. This resets the rhythm of your heart. Not everyone with atrial fibrillation needs this treatment. For some people, taking medicines may be better. Most people can live with an uneven heartbeat. It just has to be kept under control so the heart does not beat too fast.  Use this information to help you and your doctor decide which treatment to choose for atrial fibrillation. What are carrera points about this decision? · Electrical cardioversion can return your heart to a normal rhythm. But the problem can come back. The longer you have had atrial fibrillation, the more likely it is to come back after this treatment. · Cardioversion may not work as well when an uneven heartbeat is caused by another heart disease, such as heart failure. · If your symptoms bother you a lot, you may want to try cardioversion. But even if it works, you may still need to take blood thinners to prevent a stroke. · If you don't have symptoms, or if they don't bother you much, you can try medicines to slow your heart rate. And you can take blood thinners to prevent a stroke. · Cardioversion does have risks, such as stroke. Discuss the risks with your doctor. Make sure you understand them. · You may have more than one heart problem. Cardioversion doesn't work as well if you have more than one heart problem. Why might you choose electrical cardioversion? · It restores the normal heart rhythm for most people. · The idea of having an electric shock does not bother you. · Your symptoms bother you a lot. · You have had atrial fibrillation just one time. · You do not have other heart problems. · You may not have to take as many medicines.  Or you may not need to take them as long.  Why might you choose rate-control medicines? · These medicines keep many people from having symptoms. · You prefer to take medicines rather than have an electric shock. · Your symptoms don't bother you much. · If these medicines don't work, you can still try electrical cardioversion. Your decision  Thinking about the facts and your feelings can help you make a decision that is right for you. Be sure you understand the benefits and risks of your options. And think about what else you need to do before you make the decision. Where can you learn more? Go to http://charan-melvin.info/. Enter Q891 in the search box to learn more about \"Deciding Between Electrical Cardioversion and Rate Control Medicines for Atrial Fibrillation. \"  Current as of: December 6, 2017  Content Version: 11.7  © 8640-6716 [employer]. Care instructions adapted under license by [employer] (which disclaims liability or warranty for this information). If you have questions about a medical condition or this instruction, always ask your healthcare professional. [place] [address] any warranty or liability for your use of this information. Musculoskeletal Chest Pain: Care Instructions  Your Care Instructions    Chest pain is not always a sign that something is wrong with your heart or that you have another serious problem. The doctor thinks your chest pain is caused by strained muscles or ligaments, inflamed chest cartilage, or another problem in your chest, rather than by your heart. You may need more tests to find the cause of your chest pain. Follow-up care is a key part of your treatment and safety. Be sure to make and go to all appointments, and call your doctor if you are having problems. It's also a good idea to know your test results and keep a list of the medicines you take. How can you care for yourself at home?   · Take pain medicines exactly as directed. ¨ If the doctor gave you a prescription medicine for pain, take it as prescribed. ¨ If you are not taking a prescription pain medicine, ask your doctor if you can take an over-the-counter medicine. · Rest and protect the sore area. · Stop, change, or take a break from any activity that may be causing your pain or soreness. · Put ice or a cold pack on the sore area for 10 to 20 minutes at a time. Try to do this every 1 to 2 hours for the next 3 days (when you are awake) or until the swelling goes down. Put a thin cloth between the ice and your skin. · After 2 or 3 days, apply a heating pad set on low or a warm cloth to the area that hurts. Some doctors suggest that you go back and forth between hot and cold. · Do not wrap or tape your ribs for support. This may cause you to take smaller breaths, which could increase your risk of lung problems. · Mentholated creams such as Bengay or Icy Hot may soothe sore muscles. Follow the instructions on the package. · Follow your doctor's instructions for exercising. · Gentle stretching and massage may help you get better faster. Stretch slowly to the point just before pain begins, and hold the stretch for at least 15 to 30 seconds. Do this 3 or 4 times a day. Stretch just after you have applied heat. · As your pain gets better, slowly return to your normal activities. Any increased pain may be a sign that you need to rest a while longer. When should you call for help? Call 911 anytime you think you may need emergency care. For example, call if:    · You have chest pain or pressure. This may occur with:  ¨ Sweating. ¨ Shortness of breath. ¨ Nausea or vomiting. ¨ Pain that spreads from the chest to the neck, jaw, or one or both shoulders or arms. ¨ Dizziness or lightheadedness. ¨ A fast or uneven pulse. After calling 911, chew 1 adult-strength aspirin. Wait for an ambulance.  Do not try to drive yourself.     · You have sudden chest pain and shortness of breath, or you cough up blood.    Call your doctor now or seek immediate medical care if:    · You have any trouble breathing.     · Your chest pain gets worse.     · Your chest pain occurs consistently with exercise and is relieved by rest.    Watch closely for changes in your health, and be sure to contact your doctor if:    · Your chest pain does not get better after 1 week. Where can you learn more? Go to http://charan-melvin.info/. Enter V293 in the search box to learn more about \"Musculoskeletal Chest Pain: Care Instructions. \"  Current as of: November 20, 2017  Content Version: 11.7  © 1651-2632 [employer]. Care instructions adapted under license by [employer] (which disclaims liability or warranty for this information). If you have questions about a medical condition or this instruction, always ask your healthcare professional. [address] any warranty or liability for your use of this information. Chest Pain: Care Instructions  Your Care Instructions    There are many things that can cause chest pain. Some are not serious and will get better on their own in a few days. But some kinds of chest pain need more testing and treatment. Your doctor may have recommended a follow-up visit in the next 8 to 12 hours. If you are not getting better, you may need more tests or treatment. Even though your doctor has released you, you still need to watch for any problems. The doctor carefully checked you, but sometimes problems can develop later. If you have new symptoms or if your symptoms do not get better, get medical care right away. If you have worse or different chest pain or pressure that lasts more than 5 minutes or you passed out (lost consciousness), call 911 or seek other emergency help right away. A medical visit is only one step in your treatment.  Even if you feel better, you still need to do what your doctor recommends, such as going to all suggested follow-up appointments and taking medicines exactly as directed. This will help you recover and help prevent future problems. How can you care for yourself at home? · Rest until you feel better. · Take your medicine exactly as prescribed. Call your doctor if you think you are having a problem with your medicine. · Do not drive after taking a prescription pain medicine. When should you call for help? Call 911 if:    · You passed out (lost consciousness).     · You have severe difficulty breathing.     · You have symptoms of a heart attack. These may include:  ¨ Chest pain or pressure, or a strange feeling in your chest.  ¨ Sweating. ¨ Shortness of breath. ¨ Nausea or vomiting. ¨ Pain, pressure, or a strange feeling in your back, neck, jaw, or upper belly or in one or both shoulders or arms. ¨ Lightheadedness or sudden weakness. ¨ A fast or irregular heartbeat. After you call 911, the  may tell you to chew 1 adult-strength or 2 to 4 low-dose aspirin. Wait for an ambulance. Do not try to drive yourself.    Call your doctor today if:    · You have any trouble breathing.     · Your chest pain gets worse.     · You are dizzy or lightheaded, or you feel like you may faint.     · You are not getting better as expected.     · You are having new or different chest pain. Where can you learn more? Go to http://charan-melvin.info/. Enter A120 in the search box to learn more about \"Chest Pain: Care Instructions. \"  Current as of: November 20, 2017  Content Version: 11.7  © 1695-0962 [employer]. Care instructions adapted under license by [employer] (which disclaims liability or warranty for this information). If you have questions about a medical condition or this instruction, always ask your healthcare professional. [address] any warranty or liability for your use of this information.          Acute High Blood Pressure: Care Instructions  Your Care Instructions    Acute high blood pressure is very high blood pressure. It's a serious problem. Very high blood pressure can damage your brain, heart, eyes, and kidneys. You may have been given medicines to lower your blood pressure. You may have gotten them as pills or through a needle in one of your veins. This is called an IV. And maybe you were given other medicines too. These can be needed when high blood pressure causes other problems. To keep your blood pressure at a lower level, you may need to make healthy lifestyle changes. And you will probably need to take medicines. Be sure to follow up with your doctor about your blood pressure and what you can do about it. Follow-up care is a key part of your treatment and safety. Be sure to make and go to all appointments, and call your doctor if you are having problems. It's also a good idea to know your test results and keep a list of the medicines you take. How can you care for yourself at home? · See your doctor as often as he or she recommends. This is to make sure your blood pressure is under control. You will probably go at least 2 times a year. But it may be more often at first.  · Take your blood pressure medicine exactly as prescribed. You may take one or more types. They include diuretics, beta-blockers, ACE inhibitors, calcium channel blockers, and angiotensin II receptor blockers. Call your doctor if you think you are having a problem with your medicine. · If you take blood pressure medicine, talk to your doctor before you take decongestants or anti-inflammatory medicine, such as ibuprofen. These can raise blood pressure. · Learn how to check your blood pressure at home. Check it often. · Ask your doctor if it's okay to drink alcohol. · Talk to your doctor about lifestyle changes that can help blood pressure. These include being active and not smoking. When should you call for help?   Call 911 anytime you think you may need emergency care. This may mean having symptoms that suggest that your blood pressure is causing a serious heart or blood vessel problem. Your blood pressure may be over 180/110.   For example, call 911 if:    · You have symptoms of a heart attack. These may include:  ¨ Chest pain or pressure, or a strange feeling in the chest.  ¨ Sweating. ¨ Shortness of breath. ¨ Nausea or vomiting. ¨ Pain, pressure, or a strange feeling in the back, neck, jaw, or upper belly or in one or both shoulders or arms. ¨ Lightheadedness or sudden weakness. ¨ A fast or irregular heartbeat.     · You have symptoms of a stroke. These may include:  ¨ Sudden numbness, tingling, weakness, or loss of movement in your face, arm, or leg, especially on only one side of your body. ¨ Sudden vision changes. ¨ Sudden trouble speaking. ¨ Sudden confusion or trouble understanding simple statements. ¨ Sudden problems with walking or balance. ¨ A sudden, severe headache that is different from past headaches.     · You have severe back or belly pain.    Do not wait until your blood pressure comes down on its own. Get help right away.   Call your doctor now or seek immediate care if:    · Your blood pressure is much higher than normal (such as 180/110 or higher), but you don't have symptoms.     · You think high blood pressure is causing symptoms, such as:  ¨ Severe headache. ¨ Blurry vision.    Watch closely for changes in your health, and be sure to contact your doctor if:    · Your blood pressure measures 140/90 or higher at least 2 times. That means the top number is 140 or higher or the bottom number is 90 or higher, or both.     · You think you may be having side effects from your blood pressure medicine.     · Your blood pressure is usually normal, but it goes above normal at least 2 times. Where can you learn more? Go to http://charan-melvin.info/.   Enter S171 in the search box to learn more about \"Acute High Blood Pressure: Care Instructions. \"  Current as of: May 10, 2017  Content Version: 11.7  © 5405-1674 [employer]. Care instructions adapted under license by [employer] (which disclaims liability or warranty for this information). If you have questions about a medical condition or this instruction, always ask your healthcare professional. [address] any warranty or liability for your use of this information. Low Sodium Diet (2,000 Milligram): Care Instructions  Your Care Instructions    Too much sodium causes your body to hold on to extra water. This can raise your blood pressure and force your heart and kidneys to work harder. In very serious cases, this could cause you to be put in the hospital. It might even be life-threatening. By limiting sodium, you will feel better and lower your risk of serious problems. The most common source of sodium is salt. People get most of the salt in their diet from canned, prepared, and packaged foods. Fast food and restaurant meals also are very high in sodium. Your doctor will probably limit your sodium to less than 2,000 milligrams (mg) a day. This limit counts all the sodium in prepared and packaged foods and any salt you add to your food. Follow-up care is a key part of your treatment and safety. Be sure to make and go to all appointments, and call your doctor if you are having problems. It's also a good idea to know your test results and keep a list of the medicines you take. How can you care for yourself at home? Read food labels  · Read labels on cans and food packages. The labels tell you how much sodium is in each serving. Make sure that you look at the serving size. If you eat more than the serving size, you have eaten more sodium. · Food labels also tell you the Percent Daily Value for sodium. Choose products with low Percent Daily Values for sodium.   · Be aware that sodium can come in forms other than salt, including monosodium glutamate (MSG), sodium citrate, and sodium bicarbonate (baking soda). MSG is often added to Asian food. When you eat out, you can sometimes ask for food without MSG or added salt. Buy low-sodium foods  · Buy foods that are labeled \"unsalted\" (no salt added), \"sodium-free\" (less than 5 mg of sodium per serving), or \"low-sodium\" (less than 140 mg of sodium per serving). Foods labeled \"reduced-sodium\" and \"light sodium\" may still have too much sodium. Be sure to read the label to see how much sodium you are getting. · Buy fresh vegetables, or frozen vegetables without added sauces. Buy low-sodium versions of canned vegetables, soups, and other canned goods. Prepare low-sodium meals  · Cut back on the amount of salt you use in cooking. This will help you adjust to the taste. Do not add salt after cooking. One teaspoon of salt has about 2,300 mg of sodium. · Take the salt shaker off the table. · Flavor your food with garlic, lemon juice, onion, vinegar, herbs, and spices. Do not use soy sauce, lite soy sauce, steak sauce, onion salt, garlic salt, celery salt, mustard, or ketchup on your food. · Use low-sodium salad dressings, sauces, and ketchup. Or make your own salad dressings and sauces without adding salt. · Use less salt (or none) when recipes call for it. You can often use half the salt a recipe calls for without losing flavor. Other foods such as rice, pasta, and grains do not need added salt. · Rinse canned vegetables, and cook them in fresh water. This removes some-but not all-of the salt. · Avoid water that is naturally high in sodium or that has been treated with water softeners, which add sodium. Call your local water company to find out the sodium content of your water supply. If you buy bottled water, read the label and choose a sodium-free brand. Avoid high-sodium foods  · Avoid eating:  ¨ Smoked, cured, salted, and canned meat, fish, and poultry.   ¨ Ham, cosby, hot dogs, and luncheon meats. ¨ Regular, hard, and processed cheese and regular peanut butter. ¨ Crackers with salted tops, and other salted snack foods such as pretzels, chips, and salted popcorn. ¨ Frozen prepared meals, unless labeled low-sodium. ¨ Canned and dried soups, broths, and bouillon, unless labeled sodium-free or low-sodium. ¨ Canned vegetables, unless labeled sodium-free or low-sodium. ¨ [place] fries, pizza, tacos, and other fast foods. ¨ Pickles, olives, ketchup, and other condiments, especially soy sauce, unless labeled sodium-free or low-sodium. Where can you learn more? Go to http://charan-melvin.info/. Enter G956 in the search box to learn more about \"Low Sodium Diet (2,000 Milligram): Care Instructions. \"  Current as of: May 12, 2017  Content Version: 11.7  © 8093-5866 [employer]. Care instructions adapted under license by [employer] (which disclaims liability or warranty for this information). If you have questions about a medical condition or this instruction, always ask your healthcare professional. [place] [address] any warranty or liability for your use of this information.

## 2018-09-05 NOTE — DISCHARGE SUMMARY
2 [facility]  Hospitalist Division    Discharge Summary    Patient: Sander Montanez MRN: [id number]  [facility]: [id number]    YOB: 1960  Age: 62 y.o. Sex: female    DOA: 9/4/2018 LOS:  LOS: 1 day   Discharge Date: 9/5/2018     Admission Diagnoses: Atrial fibrillation with RVR ([facility] [address]  Atypical chest pain    Discharge Diagnoses:    Problem List as of 9/5/2018  Date Reviewed: 9/5/2018          Codes Class Noted - Resolved    HTN (hypertension) (Chronic) ICD-10-CM: I10  ICD-9-CM: 401.9  9/5/2018 - Present        Atypical chest pain ICD-10-CM: R07.89  ICD-9-CM: 786.59  9/4/2018 - Present        * (Principal)Atrial fibrillation with RVR ([facility] [address] ICD-10-CM: I48.91  ICD-9-CM: 427.31  9/4/2018 - Present        Acquired hypothyroidism (Chronic) ICD-10-CM: E03.9  ICD-9-CM: 244.9  9/5/2018 - Present              Discharge Condition: Stable    Discharge To: Home    Consults: Cardiology and [facility] Course: Courtney Pinto is a 62 y. o. female who was admitted to [facility] secondary to atypical chest pain and new onset A fib with RVR. Patient mentioned that she developed chest pain 2 days prior to presentation that felt like her heart hasd been racing off on.  CP located all over anterior chest wall with radiation to the neck and assoc with SOB. She mentioned she has felt her heart racing in the past but only for short periods of angeles. ER eval - noted to have A fib with RVR and started on cardizem gtt. Patient admitted for further workup and management. Cardiology consulted 9/5. CHADS score 1. Heparin gtt stopped. Patient rate controlled with cardizem gtt. Lopressor 12.5 mg BID started and then increased to 25 mg BID and cardizem gtt stopped. Echo completed and showed Estimated left ventricular ejection fraction is 56 - 60%. Visually measured ejection fraction. Left ventricular moderate concentric hypertrophy.  Normal left ventricular wall motion, no regional wall motion abnormality noted. Left atrial cavity size is moderately dilated. Right ventricular cavity size is mildly dilated. Right ventricular global systolic function is mildly reduced. Right atrial cavity size is mildly dilated. Mild aortic valve sclerosis with no evidence of reduced excursion. Mitral valve non-specific thickening. Mild mitral valve regurgitation. Non-specific thickening of the tricuspid valve. Moderate tricuspid valve regurgitation is present. Pulmonary arterial systolic pressure is 60 mmHg. Moderate pulmonary hypertension is present. Mildly elevated central venous pressure (5-10 mmHg); IVC diameter is less than 21 mm and collapses less than 50% with respiration. Small pericardial effusion. Patient recommended to continue with ASA at discharge for stroke prevention with afib as CHADS score 1 and patient with hx SAH. Patient is appropriate for discharge home with instructions to follow up as listed below. Physical Exam:  General appearance: alert, cooperative, no distress, appears stated age  Lungs: clear to auscultation bilaterally  Heart: irregularly irregular rhythm   Abdomen: soft, non-tender.  Bowel sounds normal. No masses,  no organomegaly  Extremities: extremities normal, atraumatic, no cyanosis or edema  Skin: Skin color, texture, turgor normal. No rashes or lesions  Neurologic: Grossly normal  PSY: Mood and affect normal, appropriately behaved    Significant Diagnostic Studies:   Recent Results (from the past 24 hour(s))   EKG, 12 LEAD, INITIAL    Collection Time: 09/04/18  5:43 PM   Result Value Ref Range    Ventricular Rate 122 BPM    Atrial Rate 166 BPM    QRS Duration 78 ms    Q-T Interval 342 ms    QTC Calculation (Bezet) 487 ms    Calculated R Axis 40 degrees    Calculated T Axis 155 degrees    Diagnosis       Atrial fibrillation with rapid ventricular response  ST & T wave abnormality, consider inferolateral ischemia or digitalis effect  Abnormal ECG  No previous ECGs available  Confirmed by Teo Shoemaker ([id number]) on 9/5/2018 1:25:10 PM     CBC WITH AUTOMATED DIFF    Collection Time: 09/04/18  6:15 PM   Result Value Ref Range    WBC 9.6 4.6 - 13.2 K/uL    RBC 4.19 (L) 4.20 - 5.30 M/uL    HGB 12.4 12.0 - 16.0 g/dL    HCT 38.0 35.0 - 45.0 %    MCV 90.7 74.0 - 97.0 FL    MCH 29.6 24.0 - 34.0 PG    MCHC 32.6 31.0 - 37.0 g/dL    RDW 14.3 11.6 - 14.5 %    PLATELET 366 055 - 732 K/uL    MPV 13.8 (H) 9.2 - 11.8 FL    NEUTROPHILS 68 40 - 73 %    LYMPHOCYTES 23 21 - 52 %    MONOCYTES 8 3 - 10 %    EOSINOPHILS 1 0 - 5 %    BASOPHILS 0 0 - 2 %    ABS. NEUTROPHILS 6.6 1.8 - 8.0 K/UL    ABS. LYMPHOCYTES 2.2 0.9 - 3.6 K/UL    ABS. MONOCYTES 0.7 0.05 - 1.2 K/UL    ABS. EOSINOPHILS 0.1 0.0 - 0.4 K/UL    ABS. BASOPHILS 0.0 0.0 - 0.1 K/UL    DF AUTOMATED     METABOLIC PANEL, COMPREHENSIVE    Collection Time: 09/04/18  6:15 PM   Result Value Ref Range    Sodium 143 136 - 145 mmol/L    Potassium 3.3 (L) 3.5 - 5.5 mmol/L    Chloride 106 100 - 108 mmol/L    CO2 30 21 - 32 mmol/L    Anion gap 7 3.0 - 18 mmol/L    Glucose 88 74 - 99 mg/dL    BUN 12 7.0 - 18 MG/DL    Creatinine 0.96 0.6 - 1.3 MG/DL    BUN/Creatinine ratio 13 12 - 20      GFR est AA >60 >60 ml/min/1.73m2    GFR est non-AA 60 (L) >60 ml/min/1.73m2    Calcium 8.6 8.5 - 10.1 MG/DL    Bilirubin, total 0.6 0.2 - 1.0 MG/DL    ALT (SGPT) 83 (H) 13 - 56 U/L    AST (SGOT) 45 (H) 15 - 37 U/L    Alk.  phosphatase 137 (H) 45 - 117 U/L    Protein, total 7.1 6.4 - 8.2 g/dL    Albumin 3.6 3.4 - 5.0 g/dL    Globulin 3.5 2.0 - 4.0 g/dL    A-G Ratio 1.0 0.8 - 1.7     TROPONIN I    Collection Time: 09/04/18  6:15 PM   Result Value Ref Range    Troponin-I, Qt. 0.29 (H) 0.0 - 0.045 NG/ML   TSH 3RD GENERATION    Collection Time: 09/04/18  6:15 PM   Result Value Ref Range    TSH 0.12 (L) 0.36 - 3.74 uIU/mL   T4, FREE    Collection Time: 09/04/18  6:15 PM   Result Value Ref Range    T4, Free 1.1 0.7 - 1.5 NG/DL   PTT    Collection Time: 09/04/18  9:05 PM   Result Value Ref Range    aPTT 28.3 23.0 - 36.4 SEC   CBC WITH AUTOMATED DIFF    Collection Time: 09/04/18 10:15 PM   Result Value Ref Range    WBC 10.2 4.6 - 13.2 K/uL    RBC 4.28 4.20 - 5.30 M/uL    HGB 12.8 12.0 - 16.0 g/dL    HCT 38.4 35.0 - 45.0 %    MCV 89.7 74.0 - 97.0 FL    MCH 29.9 24.0 - 34.0 PG    MCHC 33.3 31.0 - 37.0 g/dL    RDW 14.2 11.6 - 14.5 %    PLATELET 241 239 - 633 K/uL    MPV 13.1 (H) 9.2 - 11.8 FL    NEUTROPHILS 73 40 - 73 %    LYMPHOCYTES 19 (L) 21 - 52 %    MONOCYTES 7 3 - 10 %    EOSINOPHILS 1 0 - 5 %    BASOPHILS 0 0 - 2 %    ABS. NEUTROPHILS 7.4 1.8 - 8.0 K/UL    ABS. LYMPHOCYTES 1.9 0.9 - 3.6 K/UL    ABS. MONOCYTES 0.7 0.05 - 1.2 K/UL    ABS. EOSINOPHILS 0.1 0.0 - 0.4 K/UL    ABS. BASOPHILS 0.0 0.0 - 0.1 K/UL    DF AUTOMATED     TROPONIN I    Collection Time: 09/04/18 10:15 PM   Result Value Ref Range    Troponin-I, Qt. 0.25 (H) 0.0 - 8.884 NG/ML   METABOLIC PANEL, COMPREHENSIVE    Collection Time: 09/05/18  4:30 AM   Result Value Ref Range    Sodium 145 136 - 145 mmol/L    Potassium 3.0 (L) 3.5 - 5.5 mmol/L    Chloride 108 100 - 108 mmol/L    CO2 28 21 - 32 mmol/L    Anion gap 9 3.0 - 18 mmol/L    Glucose 87 74 - 99 mg/dL    BUN 9 7.0 - 18 MG/DL    Creatinine 0.77 0.6 - 1.3 MG/DL    BUN/Creatinine ratio 12 12 - 20      GFR est AA >60 >60 ml/min/1.73m2    GFR est non-AA >60 >60 ml/min/1.73m2    Calcium 8.4 (L) 8.5 - 10.1 MG/DL    Bilirubin, total 1.0 0.2 - 1.0 MG/DL    ALT (SGPT) 68 (H) 13 - 56 U/L    AST (SGOT) 34 15 - 37 U/L    Alk.  phosphatase 122 (H) 45 - 117 U/L    Protein, total 6.7 6.4 - 8.2 g/dL    Albumin 3.4 3.4 - 5.0 g/dL    Globulin 3.3 2.0 - 4.0 g/dL    A-G Ratio 1.0 0.8 - 1.7     LIPID PANEL    Collection Time: 09/05/18  4:30 AM   Result Value Ref Range    LIPID PROFILE          Cholesterol, total 204 (H) <200 MG/DL    Triglyceride 91 <150 MG/DL    HDL Cholesterol 54 40 - 60 MG/DL    LDL, calculated 131.8 (H) 0 - 100 MG/DL    VLDL, calculated 18.2 MG/DL    CHOL/HDL Ratio 3.8 0 - 5. 0     CBC W/O DIFF    Collection Time: 09/05/18  4:30 AM   Result Value Ref Range    WBC 10.9 4.6 - 13.2 K/uL    RBC 4.06 (L) 4.20 - 5.30 M/uL    HGB 12.3 12.0 - 16.0 g/dL    HCT 37.3 35.0 - 45.0 %    MCV 91.9 74.0 - 97.0 FL    MCH 30.3 24.0 - 34.0 PG    MCHC 33.0 31.0 - 37.0 g/dL    RDW 14.2 11.6 - 14.5 %    PLATELET 698 167 - 524 K/uL    MPV 13.5 (H) 9.2 - 11.8 FL   TROPONIN I    Collection Time: 09/05/18  4:30 AM   Result Value Ref Range    Troponin-I, Qt. 0.26 (H) 0.0 - 0.045 NG/ML   PTT    Collection Time: 09/05/18  4:30 AM   Result Value Ref Range    aPTT >180.0 (HH) 23.0 - 36.4 SEC   MAGNESIUM    Collection Time: 09/05/18  4:30 AM   Result Value Ref Range    Magnesium 1.5 (L) 1.6 - 2.6 mg/dL   TROPONIN I    Collection Time: 09/05/18 10:25 AM   Result Value Ref Range    Troponin-I, Qt. 0.24 (H) 0.0 - 0.045 NG/ML   PTT    Collection Time: 09/05/18 12:53 PM   Result Value Ref Range    aPTT 136.9 (H) 23.0 - 36.4 SEC   ECHO ADULT COMPLETE    Collection Time: 09/05/18  3:04 PM   Result Value Ref Range    Aortic Valve Systolic Peak Velocity 4.68 cm/s    AoV PG 0.0 mmHg    LVIDd 3.79 (A) 3.9 - 5.3 cm    LVPWd 1.50 (A) 0.6 - 0.9 cm    LVIDs 2.65 cm    IVSd 1.50 (A) 0.6 - 0.9 cm    LV ED Vol A2C 116.1 mL    LV ES Vol A4C 57.6 mL    LV ES Vol BP 50.6 (A) 19 - 49 mL    LVOT d 1.95 cm    LVOT Peak Velocity 60.23 cm/s    LVOT Peak Gradient 1.5 mmHg    Left Ventricle Isovolumic Relaxation Time 49.9 ms    BP EF 59.1 55 - 100 %    LV Ejection Fraction MOD 4C 54 %    LV Ejection Fraction MOD 2C 64 %    LA Vol 4C 106.05 (A) 22 - 52 mL    LA Vol 2C 100.64 (A) 22 - 52 mL    LV Mass .8 (A) 67 - 162 g    LV Mass AL Index 119.7 (A) g/m2    RVSP 21.3 mmHg    LV ES Vol A2C 41.5 mL    LVES Vol Index BP 23.7 mL/m2    LV ED Vol A4C 123.7 mL    LVED Vol Index BP 57.8 mL/m2    Est. RA Pressure 10.0 mmHg    Triscuspid Valve Regurgitation Peak Gradient 11.3 mmHg    LV ED Vol .6 (A) 56 - 104 ml    TR Max Velocity -167.83 cm/s PASP 21.3 mmHg    LA Vol Index 47.08 ml/m2    LA Vol Index 49.61 ml/m2    LVED Vol Index A4C 57.9 mL/m2    LVED Vol Index A2C 54.3 mL/m2    LVES Vol Index A4C 26.9 mL/m2    LVES Vol Index A2C 19.4 mL/m2         Discharge Medications:     Current Discharge Medication List      START taking these medications    Details   aspirin 81 mg chewable tablet Take 1 Tab by mouth daily. Qty: 30 Tab, Refills: 0      atorvastatin (LIPITOR) 40 mg tablet Take 1 Tab by mouth daily. Qty: 30 Tab, Refills: 0      metoprolol tartrate (LOPRESSOR) 25 mg tablet Take 1 Tab by mouth every twelve (12) hours. Qty: 60 Tab, Refills: 0         CONTINUE these medications which have NOT CHANGED    Details   lisinopril (PRINIVIL, ZESTRIL) 20 mg tablet Take 20 mg by mouth daily. levothyroxine (SYNTHROID) 100 mcg tablet Take 100 mcg by mouth Daily (before breakfast).              Activity: Activity as tolerated    Diet: Cardiac Diet    Wound Care: None needed    Follow-up: PCP within 1 week     Discharge time: > 35 minutes   Kash Nj NP  9/5/2018, 3:46 PM

## 2018-09-05 NOTE — ROUTINE PROCESS
Bedside, Verbal and Written shift change report given to Home Rosales RN (oncoming nurse) by Daryl Franco RN (offgoing nurse). Report included the following information SBAR, Kardex, Intake/Output, MAR, Recent Results, Med Rec Status, Procedure Summary and Cardiac Rhythm A-Fib. Heparin Drip rate re-started, changed, and rate verified w/Aj Sandhu RN - 15 units/kg/hr. Next aPPT at 1330. Cardizem drip rate verified 5 mg/hr. 
   
0715 - Shift assessment completed. Pt alert and oriented x4. No respiratory distress noted. No c/o pain reported. Call bell within reach, bed in low position. Will continue to monitor. 
   
1000 - Paged Dr Reece Alvarado, awaiting return call back. 1015 - Dr Reece Alvarado returned call, notified pt's Magnesium Lab 1.5 today. Dr Reece Alvarado ordered IVPB Magnesium 2 grams. 1113 - New Cardizem IVPB bag hung and rate verified w/Balbina Lorenzo RN. 
 
1114 - Heparin Drip stopped per MD orders. 1215 - Shift re-assessment completed, no change in pt condition. 1322 - Pt given PO Lopressor, will stop Cardizem drip in one hour. 1422 - Pt's Cardizem drip stopped. 1426 - Pt left unit via stretcher for Ultrasound - Echo. 
 
1600 - Pt returned to unit via stretcher from Ultrasound - Echo. 
 
1615 - Shift re-assessment completed, no change in pt condition. 1630 - I have reviewed discharge instructions with the patient and mother. The patient and mother verbalized understanding. Discharge medications reviewed with patient and mother and appropriate educational materials and side effects teaching were provided. IV catheter discontinued intact. Site without signs and symptoms of complications. Dressing and pressure applied. Patient armband removed and shredded. 1745 - Pt discharged to home with her mother. All pt belongings went with her.

## 2018-09-05 NOTE — PROGRESS NOTES
Reason for Admission:   New onset A-fib with RVR 
                
RRAT Score:   4 Plan for utilizing home health:   Not at this time, will cont to assess for needs. Likelihood of Readmission:  Low/Green Transition of Care Plan:    Home with out-pt follow up. Chart reviewed. Met with pt., verified all demographics. States has NO ins. States Dr. Go Rosario is her PCP, last seen > 1yr ago. NOK: Tania Ruiz, mother, with whom she lives with. Uses no DME. Independent with ADL's prior to admit. Please try to utilize $4 [employer] drug list @ discharge, if possible, as pt has NO ins., thanks. Will cont to follow for any needs. Sadia SinghRN,ext 1392. Patient has designated __her mother______________________ to participate in his/her discharge plan and to receive any needed information. Name: Tania Ruiz Address:  [address] [place] [address]. [place] [address] Phone number:  [phone] Care Management Interventions PCP Verified by CM: Yes (greater then 1 year ago) Palliative Care Criteria Met (RRAT>21 & CHF Dx)?: No 
Mode of Transport at Discharge: Other (see comment) (Family) Transition of Care Consult (CM Consult): Discharge Planning Discharge Durable Medical Equipment: No 
Physical Therapy Consult: No 
Occupational Therapy Consult: No 
Speech Therapy Consult: No 
Current Support Network: Relative's Home (Lives with her mother) Confirm Follow Up Transport: Self Plan discussed with Pt/Family/Caregiver: Yes Discharge Location Discharge Placement: Home

## 2018-09-05 NOTE — PROGRESS NOTES
[facility] Daily Progress Note Patient: hSari Graf MRN: [id number]  CSN: [id number] YOB: 1960  Age: 62 y.o. Sex: female DOA: 9/4/2018 LOS:  LOS: 1 day Chief Complaint: Chest pain, new onset Afib with RVR Interval History:   
Shari Graf is a 62 y.o. female who was admitted to [facility]/[address] secondary to atypical chest pain and new onset A fib with RVR. Patient mentioned that she developed chest pain 2 days prior to presentation that felt like her heart hasd been racing off on.  CP located all over anterior chest wall with radiation to the neck and assoc with SOB. She mentioned she has felt her heart racing in the past but only for short periods of angeles. ER eval - noted to have A fib with RVR and started on cardizem gtt. Patient admitted for further workup and management. Cardiology consulted 9/5. Subjective:  
  
C/o chest pain with deep inhalation Objective:  
  
Visit Vitals  BP (!) 150/103  Pulse 84  Temp 97.4 °F (36.3 °C)  Resp 20  
 Ht 5' 5\" (1.651 m)  Wt 108.9 kg (240 lb)  SpO2 91%  BMI 39.94 kg/m2 Physical Exam: 
General appearance: alert, cooperative, no distress, appears stated age Lungs: clear to auscultation bilaterally Heart: regular rate and rhythm, S1, S2 normal, no murmur, click, rub or gallop Abdomen: soft, non tender, non distended. Normoactive bowel sounds Extremities: extremities normal, atraumatic, no cyanosis or edema Skin: Skin color, texture, turgor normal. No rashes or lesions Neurologic: Grossly normal 
PSY: Mood and affect normal, appropriately behaved Intake and Output: 
Current Shift:    
Last three shifts:  09/03 1901 - 09/05 0700 In: 1966.7 [P.O.:120; I.V.:1846.7] Out: 2075 [Urine:2075] Recent Results (from the past 24 hour(s)) EKG, 12 LEAD, INITIAL Collection Time: 09/04/18  5:43 PM  
Result Value Ref Range Ventricular Rate 122 BPM  
 Atrial Rate 166 BPM  
 QRS Duration 78 ms Q-T Interval 342 ms QTC Calculation (Bezet) 487 ms Calculated R Axis 40 degrees Calculated T Axis 155 degrees Diagnosis Atrial fibrillation with rapid ventricular response ST & T wave abnormality, consider inferolateral ischemia or digitalis effect Abnormal ECG No previous ECGs available CBC WITH AUTOMATED DIFF Collection Time: 09/04/18  6:15 PM  
Result Value Ref Range WBC 9.6 4.6 - 13.2 K/uL  
 RBC 4.19 (L) 4.20 - 5.30 M/uL  
 HGB 12.4 12.0 - 16.0 g/dL HCT 38.0 35.0 - 45.0 % MCV 90.7 74.0 - 97.0 FL  
 MCH 29.6 24.0 - 34.0 PG  
 MCHC 32.6 31.0 - 37.0 g/dL  
 RDW 14.3 11.6 - 14.5 % PLATELET 258 199 - 280 K/uL MPV 13.8 (H) 9.2 - 11.8 FL  
 NEUTROPHILS 68 40 - 73 % LYMPHOCYTES 23 21 - 52 % MONOCYTES 8 3 - 10 % EOSINOPHILS 1 0 - 5 % BASOPHILS 0 0 - 2 %  
 ABS. NEUTROPHILS 6.6 1.8 - 8.0 K/UL  
 ABS. LYMPHOCYTES 2.2 0.9 - 3.6 K/UL  
 ABS. MONOCYTES 0.7 0.05 - 1.2 K/UL  
 ABS. EOSINOPHILS 0.1 0.0 - 0.4 K/UL  
 ABS. BASOPHILS 0.0 0.0 - 0.1 K/UL  
 DF AUTOMATED METABOLIC PANEL, COMPREHENSIVE Collection Time: 09/04/18  6:15 PM  
Result Value Ref Range Sodium 143 136 - 145 mmol/L Potassium 3.3 (L) 3.5 - 5.5 mmol/L Chloride 106 100 - 108 mmol/L  
 CO2 30 21 - 32 mmol/L Anion gap 7 3.0 - 18 mmol/L Glucose 88 74 - 99 mg/dL BUN 12 7.0 - 18 MG/DL Creatinine 0.96 0.6 - 1.3 MG/DL  
 BUN/Creatinine ratio 13 12 - 20 GFR est AA >60 >60 ml/min/1.73m2 GFR est non-AA 60 (L) >60 ml/min/1.73m2 Calcium 8.6 8.5 - 10.1 MG/DL Bilirubin, total 0.6 0.2 - 1.0 MG/DL  
 ALT (SGPT) 83 (H) 13 - 56 U/L  
 AST (SGOT) 45 (H) 15 - 37 U/L Alk. phosphatase 137 (H) 45 - 117 U/L Protein, total 7.1 6.4 - 8.2 g/dL Albumin 3.6 3.4 - 5.0 g/dL Globulin 3.5 2.0 - 4.0 g/dL  A-G Ratio 1.0 0.8 - 1.7    
TROPONIN I  
 Collection Time: 09/04/18  6:15 PM  
Result Value Ref Range Troponin-I, Qt. 0.29 (H) 0.0 - 0.045 NG/ML  
TSH 3RD GENERATION Collection Time: 09/04/18  6:15 PM  
Result Value Ref Range TSH 0.12 (L) 0.36 - 3.74 uIU/mL T4, FREE Collection Time: 09/04/18  6:15 PM  
Result Value Ref Range T4, Free 1.1 0.7 - 1.5 NG/DL  
PTT Collection Time: 09/04/18  9:05 PM  
Result Value Ref Range aPTT 28.3 23.0 - 36.4 SEC  
CBC WITH AUTOMATED DIFF Collection Time: 09/04/18 10:15 PM  
Result Value Ref Range WBC 10.2 4.6 - 13.2 K/uL  
 RBC 4.28 4.20 - 5.30 M/uL  
 HGB 12.8 12.0 - 16.0 g/dL HCT 38.4 35.0 - 45.0 % MCV 89.7 74.0 - 97.0 FL  
 MCH 29.9 24.0 - 34.0 PG  
 MCHC 33.3 31.0 - 37.0 g/dL  
 RDW 14.2 11.6 - 14.5 % PLATELET 644 008 - 690 K/uL MPV 13.1 (H) 9.2 - 11.8 FL  
 NEUTROPHILS 73 40 - 73 % LYMPHOCYTES 19 (L) 21 - 52 % MONOCYTES 7 3 - 10 % EOSINOPHILS 1 0 - 5 % BASOPHILS 0 0 - 2 %  
 ABS. NEUTROPHILS 7.4 1.8 - 8.0 K/UL  
 ABS. LYMPHOCYTES 1.9 0.9 - 3.6 K/UL  
 ABS. MONOCYTES 0.7 0.05 - 1.2 K/UL  
 ABS. EOSINOPHILS 0.1 0.0 - 0.4 K/UL  
 ABS. BASOPHILS 0.0 0.0 - 0.1 K/UL  
 DF AUTOMATED    
TROPONIN I Collection Time: 09/04/18 10:15 PM  
Result Value Ref Range Troponin-I, Qt. 0.25 (H) 0.0 - 2.750 NG/ML  
METABOLIC PANEL, COMPREHENSIVE Collection Time: 09/05/18  4:30 AM  
Result Value Ref Range Sodium 145 136 - 145 mmol/L Potassium 3.0 (L) 3.5 - 5.5 mmol/L Chloride 108 100 - 108 mmol/L  
 CO2 28 21 - 32 mmol/L Anion gap 9 3.0 - 18 mmol/L Glucose 87 74 - 99 mg/dL BUN 9 7.0 - 18 MG/DL Creatinine 0.77 0.6 - 1.3 MG/DL  
 BUN/Creatinine ratio 12 12 - 20 GFR est AA >60 >60 ml/min/1.73m2 GFR est non-AA >60 >60 ml/min/1.73m2 Calcium 8.4 (L) 8.5 - 10.1 MG/DL Bilirubin, total 1.0 0.2 - 1.0 MG/DL  
 ALT (SGPT) 68 (H) 13 - 56 U/L  
 AST (SGOT) 34 15 - 37 U/L Alk. phosphatase 122 (H) 45 - 117 U/L Protein, total 6.7 6.4 - 8.2 g/dL Albumin 3.4 3.4 - 5.0 g/dL Globulin 3.3 2.0 - 4.0 g/dL A-G Ratio 1.0 0.8 - 1.7 LIPID PANEL Collection Time: 09/05/18  4:30 AM  
Result Value Ref Range LIPID PROFILE Cholesterol, total 204 (H) <200 MG/DL Triglyceride 91 <150 MG/DL  
 HDL Cholesterol 54 40 - 60 MG/DL  
 LDL, calculated 131.8 (H) 0 - 100 MG/DL VLDL, calculated 18.2 MG/DL  
 CHOL/HDL Ratio 3.8 0 - 5.0    
CBC W/O DIFF Collection Time: 09/05/18  4:30 AM  
Result Value Ref Range WBC 10.9 4.6 - 13.2 K/uL  
 RBC 4.06 (L) 4.20 - 5.30 M/uL  
 HGB 12.3 12.0 - 16.0 g/dL HCT 37.3 35.0 - 45.0 % MCV 91.9 74.0 - 97.0 FL  
 MCH 30.3 24.0 - 34.0 PG  
 MCHC 33.0 31.0 - 37.0 g/dL  
 RDW 14.2 11.6 - 14.5 % PLATELET 919 025 - 888 K/uL MPV 13.5 (H) 9.2 - 11.8 FL  
TROPONIN I Collection Time: 09/05/18  4:30 AM  
Result Value Ref Range Troponin-I, Qt. 0.26 (H) 0.0 - 0.045 NG/ML  
PTT Collection Time: 09/05/18  4:30 AM  
Result Value Ref Range aPTT >180.0 (HH) 23.0 - 36.4 SEC Lab Results Component Value Date/Time Glucose 87 09/05/2018 04:30 AM  
 Glucose 88 09/04/2018 06:15 PM  
  
 
Assessment/Plan:  
 
Patient Active Problem List  
Diagnosis Code  Atypical chest pain R07.89  Atrial fibrillation with RVR (HCC) I48.91  
 
 
A/P: 
A fib with RVR  
- new onset - Cardizem gtt  
- HR better controlled after starting it - Cardiology consulted 9/5 
- Heparin gtt discontinued Atypical Chest Pain - Serial cardiac enzymes 0.29>0.25>0.26 
- ASA, statins HTN  
- Lisinopril 20 mg daily  
- is also on Cardizem gtt currently Hypothyroidism - Levothyroxine DVT Prophylaxis 
- Heparin Barbar Pitcher, [address] [facility]ist [facility] Pager:  [phone] Office:  [phone]

## 2018-09-05 NOTE — H&P
[address] [place], [place] [address] Hospitalist Division History & Physical 
 
Patient: Shari Graf MRN: [id number]  CSN: [id number] YOB: 1960  Age: 62 y.o. Sex: female DOA: 2018 LOS:  LOS: 1 day DOA: 2018 Assessment/Plan Active Problems: 
  Atypical chest pain (2018) Atrial fibrillation with RVR ([address] (2018) Plan: 1. A fib with RVR - new onset - Cardizem gtt - HR better controlled after starting it 2. Atypical chest Pain - serial cardiac enzymes , ASA, statins 3. HypoT4 - continue synthroid 4. HTN - resume home meds - lisinopril ,  is also on Cardizem gtt currently DVT Px - Heparin FC  
 
 
 
 
 
 
 
HPI:  
 
Courtney Cook Ra is a 62 y.o. female who is being admitted 2y to atypical chest pain & A fib with RVR - new onset Pt mentions that she developed chest pain 2 days ago , felt like her heart has been racing off & on  
CP located all over anterior chest wall with radiation to the neck & assoc with SOB She mentions she has felt her heart racing racing in the past but only for short periods of time ER eval - pt says she feels much better than when she came to the ER Noted to have A fib with RVR - started on cardizem gtt Will admit to the hosp for further eval  
 
Past Medical History:  
Diagnosis Date  Hypertension  Hypothyroid Past Surgical History:  
Procedure Laterality Date  HX  SECTION History reviewed. No pertinent family history. Social History Social History  Marital status:  Spouse name: N/A  
 Number of children: N/A  
 Years of education: N/A Social History Main Topics  Smoking status: Never Smoker  Smokeless tobacco: Never Used  Alcohol use Yes  Drug use: No  
 Sexual activity: Not Asked Other Topics Concern  None Social History Narrative  None Prior to Admission medications Medication Sig Start Date End Date Taking? Authorizing Provider  
lisinopril (PRINIVIL, ZESTRIL) 20 mg tablet Take 20 mg by mouth daily. Yes Phys Other, MD  
levothyroxine (SYNTHROID) 100 mcg tablet Take 100 mcg by mouth Daily (before breakfast). Yes Phys Other, MD  
 
 
Allergies Allergen Reactions  Ampicillin Rash  Penicillins Unknown (comments) Syncope  Seafood Rash  Shellfish Derived Hives Review of Systems A comprehensive review of systems was negative except for that written in the History of Present Illness. Physical Exam:  
  
Visit Vitals  BP (!) 154/114  Pulse 85  Temp 98.1 °F (36.7 °C)  Resp 18  Ht 5' 5\" (1.651 m)  Wt 108.9 kg (240 lb)  SpO2 99%  BMI 39.94 kg/m2 Physical Exam: 
 
Gen: In general, this is a well nourished female in no acute distress HEENT: Sclerae nonicteric. Oral mucous membranes moist. Dentition good Neck: Supple with midline trachea. CV: RRR without murmur or rub appreciated. Resp:Respirations are unlabored without use of accessory muscles. Lung fields bilaterally without wheezes or rhonchi. Abd: Soft, nontender, nondistended. Extrem: Extremities are warm, without cyanosis or clubbing. No pitting pretibial edema Skin: Warm, no visible rashes. Neuro: Patient is alert, oriented, and cooperative. No obvious focal defects. Moves all 4 extremities. Labs Reviewed: 
 
Recent Results (from the past 24 hour(s)) EKG, 12 LEAD, INITIAL Collection Time: 09/04/18  5:43 PM  
Result Value Ref Range Ventricular Rate 122 BPM  
 Atrial Rate 166 BPM  
 QRS Duration 78 ms Q-T Interval 342 ms QTC Calculation (Bezet) 487 ms Calculated R Axis 40 degrees Calculated T Axis 155 degrees Diagnosis Atrial fibrillation with rapid ventricular response ST & T wave abnormality, consider inferolateral ischemia or digitalis effect Abnormal ECG No previous ECGs available CBC WITH AUTOMATED DIFF  
 Collection Time: 09/04/18  6:15 PM  
Result Value Ref Range WBC 9.6 4.6 - 13.2 K/uL  
 RBC 4.19 (L) 4.20 - 5.30 M/uL  
 HGB 12.4 12.0 - 16.0 g/dL HCT 38.0 35.0 - 45.0 % MCV 90.7 74.0 - 97.0 FL  
 MCH 29.6 24.0 - 34.0 PG  
 MCHC 32.6 31.0 - 37.0 g/dL  
 RDW 14.3 11.6 - 14.5 % PLATELET 164 306 - 529 K/uL MPV 13.8 (H) 9.2 - 11.8 FL  
 NEUTROPHILS 68 40 - 73 % LYMPHOCYTES 23 21 - 52 % MONOCYTES 8 3 - 10 % EOSINOPHILS 1 0 - 5 % BASOPHILS 0 0 - 2 %  
 ABS. NEUTROPHILS 6.6 1.8 - 8.0 K/UL  
 ABS. LYMPHOCYTES 2.2 0.9 - 3.6 K/UL  
 ABS. MONOCYTES 0.7 0.05 - 1.2 K/UL  
 ABS. EOSINOPHILS 0.1 0.0 - 0.4 K/UL  
 ABS. BASOPHILS 0.0 0.0 - 0.1 K/UL  
 DF AUTOMATED METABOLIC PANEL, COMPREHENSIVE Collection Time: 09/04/18  6:15 PM  
Result Value Ref Range Sodium 143 136 - 145 mmol/L Potassium 3.3 (L) 3.5 - 5.5 mmol/L Chloride 106 100 - 108 mmol/L  
 CO2 30 21 - 32 mmol/L Anion gap 7 3.0 - 18 mmol/L Glucose 88 74 - 99 mg/dL BUN 12 7.0 - 18 MG/DL Creatinine 0.96 0.6 - 1.3 MG/DL  
 BUN/Creatinine ratio 13 12 - 20 GFR est AA >60 >60 ml/min/1.73m2 GFR est non-AA 60 (L) >60 ml/min/1.73m2 Calcium 8.6 8.5 - 10.1 MG/DL Bilirubin, total 0.6 0.2 - 1.0 MG/DL  
 ALT (SGPT) 83 (H) 13 - 56 U/L  
 AST (SGOT) 45 (H) 15 - 37 U/L Alk. phosphatase 137 (H) 45 - 117 U/L Protein, total 7.1 6.4 - 8.2 g/dL Albumin 3.6 3.4 - 5.0 g/dL Globulin 3.5 2.0 - 4.0 g/dL A-G Ratio 1.0 0.8 - 1.7    
TROPONIN I Collection Time: 09/04/18  6:15 PM  
Result Value Ref Range Troponin-I, Qt. 0.29 (H) 0.0 - 0.045 NG/ML  
TSH 3RD GENERATION Collection Time: 09/04/18  6:15 PM  
Result Value Ref Range TSH 0.12 (L) 0.36 - 3.74 uIU/mL T4, FREE Collection Time: 09/04/18  6:15 PM  
Result Value Ref Range T4, Free 1.1 0.7 - 1.5 NG/DL  
PTT Collection Time: 09/04/18  9:05 PM  
Result Value Ref Range  aPTT 28.3 23.0 - 36.4 SEC  
CBC WITH AUTOMATED DIFF  
 Collection Time: 09/04/18 10:15 PM  
Result Value Ref Range WBC 10.2 4.6 - 13.2 K/uL  
 RBC 4.28 4.20 - 5.30 M/uL  
 HGB 12.8 12.0 - 16.0 g/dL HCT 38.4 35.0 - 45.0 % MCV 89.7 74.0 - 97.0 FL  
 MCH 29.9 24.0 - 34.0 PG  
 MCHC 33.3 31.0 - 37.0 g/dL  
 RDW 14.2 11.6 - 14.5 % PLATELET 932 478 - 219 K/uL MPV 13.1 (H) 9.2 - 11.8 FL  
 NEUTROPHILS 73 40 - 73 % LYMPHOCYTES 19 (L) 21 - 52 % MONOCYTES 7 3 - 10 % EOSINOPHILS 1 0 - 5 % BASOPHILS 0 0 - 2 %  
 ABS. NEUTROPHILS 7.4 1.8 - 8.0 K/UL  
 ABS. LYMPHOCYTES 1.9 0.9 - 3.6 K/UL  
 ABS. MONOCYTES 0.7 0.05 - 1.2 K/UL  
 ABS. EOSINOPHILS 0.1 0.0 - 0.4 K/UL  
 ABS. BASOPHILS 0.0 0.0 - 0.1 K/UL  
 DF AUTOMATED    
TROPONIN I Collection Time: 09/04/18 10:15 PM  
Result Value Ref Range Troponin-I, Qt. 0.25 (H) 0.0 - 0.045 NG/ML Imaging Reviewed: PCXR - report pending Pola Mena MD 
9/5/2018, 8:54 PM

## 2018-09-05 NOTE — INTERDISCIPLINARY ROUNDS
IDR Summary Patient: Rosales Marte MRN: [id number]    Age: 62 y.o.  : 1960 Admit Diagnosis: Atrial fibrillation with RVR ([address] Atypical chest pain DIET status: cardiac diet Lines/Tubes:  
IV: YES   Needed: YES Muniz: NO  Needed:NO Central Line: NO Needed: NO 
 
 
VTE Prophylaxis: mechanical  
 
Mobility needs: No  
 
 
 
Disposition/Care Management: 
Discharge plan: home Barriers to discharge:  
Financial concerns:No  
PCP: Sandra Abreu MD   
: NO  
 
 
LOS: 1 days Expected days until discharge: TBD Signed:  
 
BRANDIN Balderas [facility] Pager:  [phone] Office:  [phone]

## 2018-09-05 NOTE — CONSULTS
[facility] - Consult Note    Consultation request by Dr. Lyndsey Jett for advice/opinion related to evaluating atrial fibrillation    Date of  Admission: 9/4/2018  5:39 PM   Primary Care Physician:  Antony Ledezma MD    I saw, evaluated, interviewed and examined the patient personally. I agree with the findings and plan of care as documented below with PA-C note  New diagnosis of a.fib  Likely the cause of dyspnea and atypical CP. Never had that before  ECHO pending  Increasing BB to 25 mg BID  Continue lisinopril  H/O SAH in 2008  CHADS2 score 1 at this time. H/O HTN. Denies ischemic CVA or CHF or DM  ASA for now    Jose Rodriguez MD           Assessment:     -Atrial fibrillation, new diagnosis, CHADS score 1 (+ HTN)  -Flat indeterminate troponin.   -Hx subarachnoid hemorrhage 2008, CT and CTA showed no aneurysms as cause of bleeding  -Hypertension, uncontrolled  -Hypothyroidism, Hx subtotal thyroidectomy  -Hx polysubstance abuse - cocaine, marijuana, alcohol     Plan:     Pt presented with chest pain, was found to be in atrial fibrillation. -HR has been overall controlled; HR will become fast with movement but return to < 100. Would switch from Cardizem gtts to PO BB.  -Anticoagulation with heparin gtts stopped by primary team.  CHADS score of 1 (+ HTN). Suboptimal candidate for anticoagulation given Hx atraumatic SAH. History of Present Illness: This is a 62 y.o. female admitted for Atrial fibrillation with RVR ([address]  Atypical chest pain. Patient complains of:  Chest pain    Courtney Pinto is a 62 y.o. female with PMHx as noted above, who presented to the hospital due to right-sided chest pain that started while at rest.  She states that she could feel the pain, which is described as a soreness, in her neck and shoulders as well. She reports shortness of breath, which when asked to describe is more so worsening of pain with deep inspiration.   She reports that the area was initially very tender as well. She denies any recent injury/trauma. She denies any hormone use or recent travel. Cardiac risk factors: hypertension      Review of Symptoms:  Except as stated above include:  Constitutional:  negative  Respiratory:  negative  Cardiovascular:  As per HPI  Gastrointestinal: negative  Genitourinary:  negative  Musculoskeletal:  Negative  Neurological:  Negative  Dermatological:  Negative  Endocrinological: Negative  Psychological:  Negative       Past Medical History:     Past Medical History:   Diagnosis Date    A-fib ([facility] 09/2018    Hypertension     Hypothyroid          Social History:     Social History     Social History    Marital status:      Spouse name: N/A    Number of children: N/A    Years of education: N/A     Social History Main Topics    Smoking status: Never Smoker    Smokeless tobacco: Never Used    Alcohol use Yes    Drug use: No    Sexual activity: Not Asked     Other Topics Concern    None     Social History Narrative        Family History:   History reviewed. No pertinent family history. Medications:      Allergies   Allergen Reactions    Ampicillin Rash    Penicillins Unknown (comments)     Syncope    Seafood Rash    Shellfish Derived Hives        Current Facility-Administered Medications   Medication Dose Route Frequency    nitroglycerin (NITROSTAT) tablet 0.4 mg  0.4 mg SubLINGual PRN    levothyroxine (SYNTHROID) tablet 100 mcg  100 mcg Oral 6am    lisinopril (PRINIVIL, ZESTRIL) tablet 20 mg  20 mg Oral DAILY    potassium chloride (K-DUR, KLOR-CON) SR tablet 40 mEq  40 mEq Oral BID    metoprolol tartrate (LOPRESSOR) tablet 12.5 mg  12.5 mg Oral Q12H    acetaminophen (TYLENOL) tablet 650 mg  650 mg Oral Q6H PRN    ondansetron (ZOFRAN) injection 4 mg  4 mg IntraVENous Q6H PRN    0.9% sodium chloride infusion  75 mL/hr IntraVENous CONTINUOUS    dilTIAZem (CARDIZEM) 100 mg in 0.9% sodium chloride (MBP/ADV) 100 mL infusion  5 mg/hr IntraVENous CONTINUOUS    aspirin chewable tablet 81 mg  81 mg Oral DAILY    [START ON 9/6/2018] atorvastatin (LIPITOR) tablet 40 mg  40 mg Oral DAILY         Physical Exam:     Visit Vitals    /88    Pulse 85    Temp 98.3 °F (36.8 °C)    Resp 18    Ht 5' 5\" (1.651 m)    Wt 240 lb (108.9 kg)    SpO2 92%    BMI 39.94 kg/m2     BP Readings from Last 3 Encounters:   09/05/18 140/88     Pulse Readings from Last 3 Encounters:   09/05/18 85     Wt Readings from Last 3 Encounters:   09/04/18 240 lb (108.9 kg)       General:  alert, cooperative, no distress, appears stated age  Neck:  No JVD  Chest wall: No significant tenderness  Lungs:  clear to auscultation bilaterally  Heart:  Regular rate and rhythm  Abdomen:  abdomen is soft without significant tenderness, masses, organomegaly or guarding  Extremities:  no edema  Skin: Warm and dry.    Neuro: alert, oriented x3, affect appropriate, no focal neurological deficits, moves all extremities well, no involuntary movements  Psych: non focal     Data Review:     Recent Labs      09/05/18   0430  09/04/18   2215  09/04/18   1815   WBC  10.9  10.2  9.6   HGB  12.3  12.8  12.4   HCT  37.3  38.4  38.0   PLT  154  155  169     Recent Labs      09/05/18   0430  09/04/18   1815   NA  145  143   K  3.0*  3.3*   CL  108  106   CO2  28  30   GLU  87  88   BUN  9  12   CREA  0.77  0.96   CA  8.4*  8.6   MG  1.5*   --    ALB  3.4  3.6   SGOT  34  45*   ALT  68*  83*       Results for orders placed or performed during the hospital encounter of 09/04/18   EKG, 12 LEAD, INITIAL   Result Value Ref Range    Ventricular Rate 122 BPM    Atrial Rate 166 BPM    QRS Duration 78 ms    Q-T Interval 342 ms    QTC Calculation (Bezet) 487 ms    Calculated R Axis 40 degrees    Calculated T Axis 155 degrees    Diagnosis       Atrial fibrillation with rapid ventricular response  ST & T wave abnormality, consider inferolateral ischemia or digitalis effect  Abnormal ECG  No previous ECGs available  Confirmed by Kane Packer ([id number]) on 9/5/2018 1:25:10 PM         All Cardiac Markers in the last 24 hours:    Lab Results   Component Value Date/Time    TROIQ 0.24 (H) 09/05/2018 10:25 AM    TROIQ 0.26 (H) 09/05/2018 04:30 AM    TROIQ 0.25 (H) 09/04/2018 10:15 PM    TROIQ 0.29 (H) 09/04/2018 06:15 PM       Last Lipid:    Lab Results   Component Value Date/Time    Cholesterol, total 204 (H) 09/05/2018 04:30 AM    HDL Cholesterol 54 09/05/2018 04:30 AM    LDL, calculated 131.8 (H) 09/05/2018 04:30 AM    Triglyceride 91 09/05/2018 04:30 AM    CHOL/HDL Ratio 3.8 09/05/2018 04:30 AM       Signed By: Olivier Bledsoe PA-C     September 5, 2018

## 2019-10-15 ENCOUNTER — APPOINTMENT (OUTPATIENT)
Dept: GENERAL RADIOLOGY | Age: 59
End: 2019-10-15
Attending: PHYSICIAN ASSISTANT
Payer: MEDICAID

## 2019-10-15 ENCOUNTER — HOSPITAL ENCOUNTER (EMERGENCY)
Age: 59
Discharge: HOME | End: 2019-10-15
Attending: EMERGENCY MEDICINE
Payer: MEDICAID

## 2019-10-15 VITALS
HEART RATE: 89 BPM | BODY MASS INDEX: 38.82 KG/M2 | HEIGHT: 65 IN | DIASTOLIC BLOOD PRESSURE: 122 MMHG | WEIGHT: 233 LBS | TEMPERATURE: 98.2 F | SYSTOLIC BLOOD PRESSURE: 156 MMHG | OXYGEN SATURATION: 100 % | RESPIRATION RATE: 16 BRPM

## 2019-10-15 DIAGNOSIS — I10 ESSENTIAL HYPERTENSION: Primary | Chronic | ICD-10-CM

## 2019-10-15 DIAGNOSIS — M25.562 ACUTE PAIN OF LEFT KNEE: ICD-10-CM

## 2019-10-15 PROCEDURE — 73564 X-RAY EXAM KNEE 4 OR MORE: CPT

## 2019-10-15 PROCEDURE — 99283 EMERGENCY DEPT VISIT LOW MDM: CPT

## 2019-10-15 RX ORDER — TRAMADOL HYDROCHLORIDE 50 MG/1
50 TABLET ORAL
Qty: 8 TAB | Refills: 0 | Status: SHIPPED | OUTPATIENT
Start: 2019-10-15 | End: 2019-10-18
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
## 2019-10-16 NOTE — DISCHARGE INSTRUCTIONS
Patient Education        Acute High Blood Pressure: Care Instructions  Your Care Instructions    Acute high blood pressure is very high blood pressure. It's a serious problem. Very high blood pressure can damage your brain, heart, eyes, and kidneys. You may have been given medicines to lower your blood pressure. You may have gotten them as pills or through a needle in one of your veins. This is called an IV. And maybe you were given other medicines too. These can be needed when high blood pressure causes other problems. To keep your blood pressure at a lower level, you may need to make healthy lifestyle changes. And you will probably need to take medicines. Be sure to follow up with your doctor about your blood pressure and what you can do about it. Follow-up care is a key part of your treatment and safety. Be sure to make and go to all appointments, and call your doctor if you are having problems. It's also a good idea to know your test results and keep a list of the medicines you take. How can you care for yourself at home? · See your doctor as often as he or she recommends. This is to make sure your blood pressure is under control. You will probably go at least 2 times a year. But it may be more often at first.  · Take your blood pressure medicine exactly as prescribed. You may take one or more types. They include diuretics, beta-blockers, ACE inhibitors, calcium channel blockers, and angiotensin II receptor blockers. Call your doctor if you think you are having a problem with your medicine. · If you take blood pressure medicine, talk to your doctor before you take decongestants or anti-inflammatory medicine, such as ibuprofen. These can raise blood pressure. · Learn how to check your blood pressure at home. Check it often. · Ask your doctor if it's okay to drink alcohol. · Talk to your doctor about lifestyle changes that can help blood pressure.  These include being active and managing your weight. · Don't smoke. Smoking increases your risk for heart attack and stroke. When should you call for help? Call  911 anytime you think you may need emergency care. This may mean having symptoms that suggest that your blood pressure is causing a serious heart or blood vessel problem. Your blood pressure may be over 180/120.   For example, call  911 if:    · You have symptoms of a heart attack. These may include:  ? Chest pain or pressure, or a strange feeling in the chest.  ? Sweating. ? Shortness of breath. ? Nausea or vomiting. ? Pain, pressure, or a strange feeling in the back, neck, jaw, or upper belly or in one or both shoulders or arms. ? Lightheadedness or sudden weakness. ? A fast or irregular heartbeat.     · You have symptoms of a stroke. These may include:  ? Sudden numbness, tingling, weakness, or loss of movement in your face, arm, or leg, especially on only one side of your body. ? Sudden vision changes. ? Sudden trouble speaking. ? Sudden confusion or trouble understanding simple statements. ? Sudden problems with walking or balance. ? A sudden, severe headache that is different from past headaches.     · You have severe back or belly pain.    Do not wait until your blood pressure comes down on its own. Get help right away.   Call your doctor now or seek immediate care if:    · Your blood pressure is much higher than normal (such as 180/120 or higher), but you don't have symptoms.     · You think high blood pressure is causing symptoms, such as:  ? Severe headache.  ? Blurry vision.    Watch closely for changes in your health, and be sure to contact your doctor if:    · Your blood pressure measures higher than your doctor recommends at least 2 times. That means the top number is higher or the bottom number is higher, or both.     · You think you may be having side effects from your blood pressure medicine. Where can you learn more?   Go to http://charan-melvin.info/. Enter G409 in the search box to learn more about \"Acute High Blood Pressure: Care Instructions. \"  Current as of: April 9, 2019  Content Version: 12.2  © 8960-7632 [employer]. Care instructions adapted under license by [employer] (which disclaims liability or warranty for this information). If you have questions about a medical condition or this instruction, always ask your healthcare professional. [place] [address] any warranty or liability for your use of this information. Patient Education        Knee Pain or Injury: Care Instructions  Your Care Instructions    Injuries are a common cause of knee problems. Sudden (acute) injuries may be caused by a direct blow to the knee. They can also be caused by abnormal twisting, bending, or falling on the knee. Pain, bruising, or swelling may be severe, and may start within minutes of the injury. Overuse is another cause of knee pain. Other causes are climbing stairs, kneeling, and other activities that use the knee. Everyday wear and tear, especially as you get older, also can cause knee pain. Rest, along with home treatment, often relieves pain and allows your knee to heal. If you have a serious knee injury, you may need tests and treatment. Follow-up care is a key part of your treatment and safety. Be sure to make and go to all appointments, and call your doctor if you are having problems. It's also a good idea to know your test results and keep a list of the medicines you take. How can you care for yourself at home? · Be safe with medicines. Read and follow all instructions on the label. ? If the doctor gave you a prescription medicine for pain, take it as prescribed. ? If you are not taking a prescription pain medicine, ask your doctor if you can take an over-the-counter medicine. · Rest and protect your knee.  Take a break from any activity that may cause pain.  · Put ice or a cold pack on your knee for 10 to 20 minutes at a time. Put a thin cloth between the ice and your skin. · Prop up a sore knee on a pillow when you ice it or anytime you sit or lie down for the next 3 days. Try to keep it above the level of your heart. This will help reduce swelling. · If your knee is not swollen, you can put moist heat, a heating pad, or a warm cloth on your knee. · If your doctor recommends an elastic bandage, sleeve, or other type of support for your knee, wear it as directed. · Follow your doctor's instructions about how much weight you can put on your leg. Use a cane, crutches, or a walker as instructed. · Follow your doctor's instructions about activity during your healing process. If you can do mild exercise, slowly increase your activity. · Reach and stay at a healthy weight. Extra weight can strain the joints, especially the knees and hips, and make the pain worse. Losing even a few pounds may help. When should you call for help? Call 911 anytime you think you may need emergency care. For example, call if:    · You have symptoms of a blood clot in your lung (called a pulmonary embolism). These may include:  ? Sudden chest pain. ? Trouble breathing. ? Coughing up blood.    Call your doctor now or seek immediate medical care if:    · You have severe or increasing pain.     · Your leg or foot turns cold or changes color.     · You cannot stand or put weight on your knee.     · Your knee looks twisted or bent out of shape.     · You cannot move your knee.     · You have signs of infection, such as:  ? Increased pain, swelling, warmth, or redness. ? Red streaks leading from the knee. ? Pus draining from a place on your knee. ? A fever.     · You have signs of a blood clot in your leg (called a deep vein thrombosis), such as:  ? Pain in your calf, back of the knee, thigh, or groin. ?  Redness and swelling in your leg or groin.    Watch closely for changes in your health, and be sure to contact your doctor if:    · You have tingling, weakness, or numbness in your knee.     · You have any new symptoms, such as swelling.     · You have bruises from a knee injury that last longer than 2 weeks.     · You do not get better as expected. Where can you learn more? Go to http://charan-melvin.info/. Enter K195 in the search box to learn more about \"Knee Pain or Injury: Care Instructions. \"  Current as of: June 26, 2019  Content Version: 12.2  © 2569-5996 [employer]. Care instructions adapted under license by [employer] (which disclaims liability or warranty for this information). If you have questions about a medical condition or this instruction, always ask your healthcare professional. [address] any warranty or liability for your use of this information.
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
## 2019-10-16 NOTE — ED NOTES
I have reviewed discharge instructions with the patient. The patient verbalized understanding. Discharge medications reviewed with patient and appropriate educational materials and side effects teaching were provided. Patient in stable condition at discharge.

## 2019-12-17 ENCOUNTER — APPOINTMENT (OUTPATIENT)
Dept: GENERAL RADIOLOGY | Age: 59
End: 2019-12-17
Attending: PHYSICIAN ASSISTANT
Payer: MEDICAID

## 2019-12-17 ENCOUNTER — HOSPITAL ENCOUNTER (EMERGENCY)
Age: 59
Discharge: HOME | End: 2019-12-17
Attending: EMERGENCY MEDICINE | Admitting: EMERGENCY MEDICINE
Payer: MEDICAID

## 2019-12-17 ENCOUNTER — APPOINTMENT (OUTPATIENT)
Dept: VASCULAR SURGERY | Age: 59
End: 2019-12-17
Attending: EMERGENCY MEDICINE
Payer: MEDICAID

## 2019-12-17 VITALS
RESPIRATION RATE: 17 BRPM | BODY MASS INDEX: 39.15 KG/M2 | HEIGHT: 65 IN | HEART RATE: 110 BPM | WEIGHT: 235 LBS | SYSTOLIC BLOOD PRESSURE: 145 MMHG | TEMPERATURE: 98 F | DIASTOLIC BLOOD PRESSURE: 115 MMHG | OXYGEN SATURATION: 96 %

## 2019-12-17 DIAGNOSIS — N17.9 AKI (ACUTE KIDNEY INJURY) (HCC): ICD-10-CM

## 2019-12-17 DIAGNOSIS — R77.8 ELEVATED TROPONIN: ICD-10-CM

## 2019-12-17 DIAGNOSIS — I50.9 ACUTE CONGESTIVE HEART FAILURE, UNSPECIFIED HEART FAILURE TYPE (HCC): Primary | ICD-10-CM

## 2019-12-17 DIAGNOSIS — M79.89 SWELLING OF LOWER EXTREMITY: ICD-10-CM

## 2019-12-17 LAB
ALBUMIN SERPL-MCNC: 3.4 G/DL (ref 3.4–5)
ALBUMIN/GLOB SERPL: 1.1 {RATIO} (ref 0.8–1.7)
ALP SERPL-CCNC: 193 U/L (ref 45–117)
ALT SERPL-CCNC: 82 U/L (ref 13–56)
ANION GAP SERPL CALC-SCNC: 7 MMOL/L (ref 3–18)
APPEARANCE UR: CLEAR
AST SERPL-CCNC: 66 U/L (ref 10–38)
BACTERIA URNS QL MICRO: ABNORMAL /HPF
BASOPHILS # BLD: 0 K/UL (ref 0–0.1)
BASOPHILS NFR BLD: 0 % (ref 0–2)
BILIRUB SERPL-MCNC: 2.1 MG/DL (ref 0.2–1)
BILIRUB UR QL: NEGATIVE
BNP SERPL-MCNC: 6615 PG/ML (ref 0–900)
BUN SERPL-MCNC: 27 MG/DL (ref 7–18)
BUN/CREAT SERPL: 21 (ref 12–20)
CALCIUM SERPL-MCNC: 8.9 MG/DL (ref 8.5–10.1)
CHLORIDE SERPL-SCNC: 106 MMOL/L (ref 100–111)
CO2 SERPL-SCNC: 30 MMOL/L (ref 21–32)
COLOR UR: YELLOW
CREAT SERPL-MCNC: 1.31 MG/DL (ref 0.6–1.3)
DIFFERENTIAL METHOD BLD: ABNORMAL
EOSINOPHIL # BLD: 0.1 K/UL (ref 0–0.4)
EOSINOPHIL NFR BLD: 1 % (ref 0–5)
EPITH CASTS URNS QL MICRO: ABNORMAL /LPF (ref 0–5)
ERYTHROCYTE [DISTWIDTH] IN BLOOD BY AUTOMATED COUNT: 15.7 % (ref 11.6–14.5)
GLOBULIN SER CALC-MCNC: 3 G/DL (ref 2–4)
GLUCOSE SERPL-MCNC: 117 MG/DL (ref 74–99)
GLUCOSE UR STRIP.AUTO-MCNC: NEGATIVE MG/DL
HCT VFR BLD AUTO: 42.2 % (ref 35–45)
HGB BLD-MCNC: 13.4 G/DL (ref 12–16)
HGB UR QL STRIP: NEGATIVE
KETONES UR QL STRIP.AUTO: NEGATIVE MG/DL
LEUKOCYTE ESTERASE UR QL STRIP.AUTO: ABNORMAL
LYMPHOCYTES # BLD: 1.3 K/UL (ref 0.9–3.6)
LYMPHOCYTES NFR BLD: 13 % (ref 21–52)
MAGNESIUM SERPL-MCNC: 1.5 MG/DL (ref 1.6–2.6)
MCH RBC QN AUTO: 31.5 PG (ref 24–34)
MCHC RBC AUTO-ENTMCNC: 31.8 G/DL (ref 31–37)
MCV RBC AUTO: 99.3 FL (ref 74–97)
MONOCYTES # BLD: 1.1 K/UL (ref 0.05–1.2)
MONOCYTES NFR BLD: 11 % (ref 3–10)
NEUTS SEG # BLD: 7.4 K/UL (ref 1.8–8)
NEUTS SEG NFR BLD: 75 % (ref 40–73)
NITRITE UR QL STRIP.AUTO: NEGATIVE
PH UR STRIP: 7 [PH] (ref 5–8)
PLATELET # BLD AUTO: 179 K/UL (ref 135–420)
PMV BLD AUTO: 12.8 FL (ref 9.2–11.8)
POTASSIUM SERPL-SCNC: 3.1 MMOL/L (ref 3.5–5.5)
PROT SERPL-MCNC: 6.4 G/DL (ref 6.4–8.2)
PROT UR STRIP-MCNC: 300 MG/DL
RBC # BLD AUTO: 4.25 M/UL (ref 4.2–5.3)
RBC #/AREA URNS HPF: 0 /HPF (ref 0–5)
SODIUM SERPL-SCNC: 143 MMOL/L (ref 136–145)
SP GR UR REFRACTOMETRY: 1.02 (ref 1–1.03)
TROPONIN I SERPL-MCNC: 0.51 NG/ML (ref 0–0.04)
UROBILINOGEN UR QL STRIP.AUTO: 1 EU/DL (ref 0.2–1)
WBC # BLD AUTO: 9.8 K/UL (ref 4.6–13.2)
WBC URNS QL MICRO: ABNORMAL /HPF (ref 0–4)

## 2019-12-17 PROCEDURE — 71046 X-RAY EXAM CHEST 2 VIEWS: CPT

## 2019-12-17 PROCEDURE — 84484 ASSAY OF TROPONIN QUANT: CPT

## 2019-12-17 PROCEDURE — 83880 ASSAY OF NATRIURETIC PEPTIDE: CPT

## 2019-12-17 PROCEDURE — 96375 TX/PRO/DX INJ NEW DRUG ADDON: CPT

## 2019-12-17 PROCEDURE — 93970 EXTREMITY STUDY: CPT

## 2019-12-17 PROCEDURE — 96374 THER/PROPH/DIAG INJ IV PUSH: CPT

## 2019-12-17 PROCEDURE — 74011250637 HC RX REV CODE- 250/637: Performed by: EMERGENCY MEDICINE

## 2019-12-17 PROCEDURE — 74011000250 HC RX REV CODE- 250: Performed by: EMERGENCY MEDICINE

## 2019-12-17 PROCEDURE — 99284 EMERGENCY DEPT VISIT MOD MDM: CPT

## 2019-12-17 PROCEDURE — 83735 ASSAY OF MAGNESIUM: CPT

## 2019-12-17 PROCEDURE — 74011250636 HC RX REV CODE- 250/636: Performed by: EMERGENCY MEDICINE

## 2019-12-17 PROCEDURE — 80053 COMPREHEN METABOLIC PANEL: CPT

## 2019-12-17 PROCEDURE — 93005 ELECTROCARDIOGRAM TRACING: CPT

## 2019-12-17 PROCEDURE — 85025 COMPLETE CBC W/AUTO DIFF WBC: CPT

## 2019-12-17 PROCEDURE — 81001 URINALYSIS AUTO W/SCOPE: CPT

## 2019-12-17 RX ORDER — METOPROLOL TARTRATE 5 MG/5ML
5 INJECTION INTRAVENOUS
Status: DISPENSED | OUTPATIENT
Start: 2019-12-17 | End: 2019-12-17

## 2019-12-17 RX ORDER — METOPROLOL TARTRATE 5 MG/5ML
5 INJECTION INTRAVENOUS ONCE
Status: COMPLETED | OUTPATIENT
Start: 2019-12-17 | End: 2019-12-17

## 2019-12-17 RX ORDER — FUROSEMIDE 10 MG/ML
20 INJECTION INTRAMUSCULAR; INTRAVENOUS ONCE
Status: COMPLETED | OUTPATIENT
Start: 2019-12-17 | End: 2019-12-17

## 2019-12-17 RX ADMIN — METOPROLOL TARTRATE 5 MG: 5 INJECTION INTRAVENOUS at 20:31

## 2019-12-17 RX ADMIN — METOPROLOL TARTRATE 5 MG: 5 INJECTION INTRAVENOUS at 20:57

## 2019-12-17 RX ADMIN — POTASSIUM BICARBONATE 40 MEQ: 782 TABLET, EFFERVESCENT ORAL at 20:36

## 2019-12-17 RX ADMIN — FUROSEMIDE 20 MG: 10 INJECTION, SOLUTION INTRAMUSCULAR; INTRAVENOUS at 20:31

## 2019-12-17 NOTE — ED TRIAGE NOTES
Pt arrives with c/o swelling in feet and ankles on and off past couple days. Pt afraid could be a blood clot. Pt with 2+ edema bilateral feet and lower legs. Pt states also sob with exertion which has been going on for months. Pt states also a sinus headache today.  6/10

## 2019-12-17 NOTE — ED PROVIDER NOTES
EMERGENCY DEPARTMENT HISTORY AND PHYSICAL EXAM    5:30 PM      Date: 12/17/2019  Patient Name: Ulises Collins    History of Presenting Illness     Chief Complaint   Patient presents with    Leg Swelling    Ankle swelling    Shortness of Breath         History Provided By: Patient      Additional History (Context): Ulises Collins is a 61 y.o. female who presents with leg swelling, pain, and shortness of breath. States that for the past couple days she has had increased swelling of bilateral lower extremities and ankles. Patient states that the swelling will go down when she goes to bed and raises them, and then gets worse throughout the day. Patient did admit to pain in both legs starting yesterday. She denies any history of DVT/PE, she is on no blood thinners. Patient does have a history of atrial fibrillation, no recent changes in medications. Patient also admits to shortness of breath that has been going for a while, but getting worse for the past couple of days. She denies any lightheadedness, dizziness, fevers, chills, chest pain, abdominal pain, nausea, vomiting. Patient states that she is not on any Lasix. PCP: Wei Prince MD      Current Facility-Administered Medications   Medication Dose Route Frequency Provider Last Rate Last Dose    furosemide (LASIX) injection 20 mg  20 mg IntraVENous ONCE Nolvia Sánchez, DO        potassium bicarb-citric acid (EFFER-K) tablet 40 mEq  40 mEq Oral NOW De[place], DO        metoprolol (LOPRESSOR) injection 5 mg  5 mg IntraVENous ONCE De[place], DO        metoprolol (LOPRESSOR) injection 5 mg  5 mg IntraVENous Q15MIN Deber[place], DO         Current Outpatient Medications   Medication Sig Dispense Refill    aspirin 81 mg chewable tablet Take 1 Tab by mouth daily. 30 Tab 0    atorvastatin (LIPITOR) 40 mg tablet Take 1 Tab by mouth daily.  30 Tab 0    metoprolol tartrate (LOPRESSOR) 25 mg tablet Take 1 Tab by mouth every twelve (12) hours. 60 Tab 0    lisinopril (PRINIVIL, ZESTRIL) 20 mg tablet Take 20 mg by mouth daily.  levothyroxine (SYNTHROID) 100 mcg tablet Take 100 mcg by mouth Daily (before breakfast). Past History     Past Medical History:  Past Medical History:   Diagnosis Date    A-fib [facility]) 2018    CAD (coronary artery disease)     A-fib    Hypertension     Hypothyroid        Past Surgical History:  Past Surgical History:   Procedure Laterality Date    HX  SECTION         Family History:  History reviewed. No pertinent family history. Social History:  Social History     Tobacco Use    Smoking status: Never Smoker    Smokeless tobacco: Never Used   Substance Use Topics    Alcohol use: Yes    Drug use: No       Allergies: Allergies   Allergen Reactions    Ampicillin Rash    Penicillins Unknown (comments)     Syncope    Seafood Rash    Shellfish Derived Hives         Review of Systems       Review of Systems   Constitutional: Negative for chills, fatigue and fever. HENT: Negative for congestion, rhinorrhea, sinus pressure, sinus pain, sneezing and sore throat. Eyes: Negative for photophobia and visual disturbance. Respiratory: Positive for shortness of breath. Negative for cough and wheezing. Cardiovascular: Positive for leg swelling. Gastrointestinal: Negative for abdominal pain, constipation, diarrhea, nausea and vomiting. Genitourinary: Negative for dysuria, frequency and hematuria. Musculoskeletal: Negative for back pain, neck pain and neck stiffness. Skin: Negative for rash and wound. Neurological: Negative for dizziness, light-headedness and headaches. Psychiatric/Behavioral: Negative for agitation, behavioral problems and confusion.          Physical Exam     Visit Vitals  BP (!) 171/110 (BP 1 Location: Right arm, BP Patient Position: Sitting)   Pulse (!) 122   Temp 98 °F (36.7 °C)   Resp 24   Ht 5' 5\" (1.651 m)   Wt 106.6 kg (235 lb)   SpO2 95%   BMI 39.11 kg/m²       Physical Exam  Constitutional:       General: She is not in acute distress. Appearance: She is well-developed. She is not ill-appearing. HENT:      Head: Normocephalic and atraumatic. Nose: No congestion or rhinorrhea. Eyes:      General:         Right eye: No discharge. Left eye: No discharge. Conjunctiva/sclera: Conjunctivae normal.   Cardiovascular:      Rate and Rhythm: Tachycardia present. Rhythm regularly irregular. Pulmonary:      Effort: Pulmonary effort is normal. No tachypnea, accessory muscle usage or respiratory distress. Breath sounds: Decreased breath sounds present. No wheezing. Comments: Diminished breath sounds in bilateral lung bases. No acute respiratory distress. Pulse ox 90% on room air. Abdominal:      General: There is no distension. Palpations: Abdomen is soft. Tenderness: There is no tenderness. Musculoskeletal:         General: No tenderness. Right lower leg: Edema present. Left lower leg: Edema present. Comments: Bilateral +1 pitting edema of the lower extremities. No calf tenderness to palpation at this time. No overlying skin changes. DP and PT +2. No sensory deficit. Skin:     General: Skin is warm and dry. Capillary Refill: Capillary refill takes less than 2 seconds. Coloration: Skin is not jaundiced. Findings: No bruising. Neurological:      General: No focal deficit present. Mental Status: She is alert and oriented to person, place, and time. Cranial Nerves: No cranial nerve deficit. Motor: No weakness. Psychiatric:         Mood and Affect: Mood normal.         Thought Content:  Thought content normal.           Diagnostic Study Results     Labs -  Recent Results (from the past 12 hour(s))   URINALYSIS W/ RFLX MICROSCOPIC    Collection Time: 12/17/19  5:15 PM   Result Value Ref Range    Color YELLOW      Appearance CLEAR      Specific gravity 1.021 1.005 - 1.030      pH (UA) 7.0 5.0 - 8.0      Protein 300 (A) NEG mg/dL    Glucose NEGATIVE  NEG mg/dL    Ketone NEGATIVE  NEG mg/dL    Bilirubin NEGATIVE  NEG      Blood NEGATIVE  NEG      Urobilinogen 1.0 0.2 - 1.0 EU/dL    Nitrites NEGATIVE  NEG      Leukocyte Esterase SMALL (A) NEG     URINE MICROSCOPIC ONLY    Collection Time: 12/17/19  5:15 PM   Result Value Ref Range    WBC 8 to 10 0 - 4 /hpf    RBC 0 0 - 5 /hpf    Epithelial cells 1+ 0 - 5 /lpf    Bacteria FEW (A) NEG /hpf   EKG, 12 LEAD, INITIAL    Collection Time: 12/17/19  5:29 PM   Result Value Ref Range    Ventricular Rate 137 BPM    QRS Duration 62 ms    Q-T Interval 314 ms    QTC Calculation (Bezet) 474 ms    Calculated R Axis 77 degrees    Calculated T Axis -118 degrees    Diagnosis       Atrial fibrillation with rapid ventricular response  T wave abnormality, consider inferior ischemia  Abnormal ECG     CBC WITH AUTOMATED DIFF    Collection Time: 12/17/19  5:51 PM   Result Value Ref Range    WBC 9.8 4.6 - 13.2 K/uL    RBC 4.25 4.20 - 5.30 M/uL    HGB 13.4 12.0 - 16.0 g/dL    HCT 42.2 35.0 - 45.0 %    MCV 99.3 (H) 74.0 - 97.0 FL    MCH 31.5 24.0 - 34.0 PG    MCHC 31.8 31.0 - 37.0 g/dL    RDW 15.7 (H) 11.6 - 14.5 %    PLATELET 993 340 - 045 K/uL    MPV 12.8 (H) 9.2 - 11.8 FL    NEUTROPHILS 75 (H) 40 - 73 %    LYMPHOCYTES 13 (L) 21 - 52 %    MONOCYTES 11 (H) 3 - 10 %    EOSINOPHILS 1 0 - 5 %    BASOPHILS 0 0 - 2 %    ABS. NEUTROPHILS 7.4 1.8 - 8.0 K/UL    ABS. LYMPHOCYTES 1.3 0.9 - 3.6 K/UL    ABS. MONOCYTES 1.1 0.05 - 1.2 K/UL    ABS. EOSINOPHILS 0.1 0.0 - 0.4 K/UL    ABS.  BASOPHILS 0.0 0.0 - 0.1 K/UL    DF AUTOMATED     METABOLIC PANEL, COMPREHENSIVE    Collection Time: 12/17/19  5:51 PM   Result Value Ref Range    Sodium 143 136 - 145 mmol/L    Potassium 3.1 (L) 3.5 - 5.5 mmol/L    Chloride 106 100 - 111 mmol/L    CO2 30 21 - 32 mmol/L    Anion gap 7 3.0 - 18 mmol/L    Glucose 117 (H) 74 - 99 mg/dL    BUN 27 (H) 7.0 - 18 MG/DL    Creatinine 1.31 (H) 0.6 - 1.3 MG/DL    BUN/Creatinine ratio 21 (H) 12 - 20      GFR est AA 50 (L) >60 ml/min/1.73m2    GFR est non-AA 42 (L) >60 ml/min/1.73m2    Calcium 8.9 8.5 - 10.1 MG/DL    Bilirubin, total 2.1 (H) 0.2 - 1.0 MG/DL    ALT (SGPT) 82 (H) 13 - 56 U/L    AST (SGOT) 66 (H) 10 - 38 U/L    Alk. phosphatase 193 (H) 45 - 117 U/L    Protein, total 6.4 6.4 - 8.2 g/dL    Albumin 3.4 3.4 - 5.0 g/dL    Globulin 3.0 2.0 - 4.0 g/dL    A-G Ratio 1.1 0.8 - 1.7     MAGNESIUM    Collection Time: 12/17/19  5:51 PM   Result Value Ref Range    Magnesium 1.5 (L) 1.6 - 2.6 mg/dL   NT-PRO BNP    Collection Time: 12/17/19  5:51 PM   Result Value Ref Range    NT pro-BNP 6,615 (H) 0 - 900 PG/ML   TROPONIN I    Collection Time: 12/17/19  5:51 PM   Result Value Ref Range    Troponin-I, QT 0.51 (H) 0.0 - 0.045 NG/ML       Radiologic Studies -   XR CHEST PA LAT    (Results Pending)         Medical Decision Making   I am the first provider for this patient. I reviewed the vital signs, available nursing notes, past medical history, past surgical history, family history and social history. Vital Signs-Reviewed the patient's vital signs. EKG: Atrial fibrillation, heart rate 137. Patient has known history of atrial fibrillation. No acute ischemic changes. Records Reviewed: Nursing Notes (Time of Review: 5:30 PM)    ED Course: Progress Notes, Reevaluation, and Consults:  Patient's repeat heart rate is still in the 130s. The patient is resisting admission at this time. I did discuss the risk associated with it, and she still resists. The patient will be given a couple doses of Lopressor to have more rate control.   She was given 1 dose of Lasix, as well as potassium replacement    Provider Notes (Medical Decision Making):   MDM  Number of Diagnoses or Management Options  Acute congestive heart failure, unspecified heart failure type [facility]):   LEONOR (acute kidney injury) [facility]):   Elevated troponin:   Swelling of lower extremity:   Diagnosis management comments: Patient is a 15-year-old female who presents with a chief complaint of bilateral lower extremity swelling and shortness of breath. The patient has a known history of atrial fibrillation, she was found to be in atrial fibrillation with RVR. The patient denies any history of CHF. She does have pitting edema bilateral lower extremities. No identifiable unilateral swelling, or pain to palpation. Ultrasound was negative for any DVT. Her BNP was found to be elevated, as well as acute kidney injury, as well as a slightly more elevated troponin. The patient likely has evidence of new onset heart failure. She had a recent echo in 2018 which did not reveal any evidence of heart failure. The patient was refusing admission despite her results. She is adamant about going home. I did give her potassium as well as 1 dose of Lasix. She was also given a couple dose of Lopressor to decrease her heart rate. The patient will be leaving [address], she understands. She will return if she is worsening symptoms. No further questions. Critical Care Time: 0      Diagnosis     Clinical Impression:   1. Acute congestive heart failure, unspecified heart failure type (HCC)    2. Elevated troponin    3. LEONOR (acute kidney injury) ([facility] [address]    4. Swelling of lower extremity        Disposition: AMA    Follow-up Information    None          Patient's Medications   Start Taking    No medications on file   Continue Taking    ASPIRIN 81 MG CHEWABLE TABLET    Take 1 Tab by mouth daily. ATORVASTATIN (LIPITOR) 40 MG TABLET    Take 1 Tab by mouth daily. LEVOTHYROXINE (SYNTHROID) 100 MCG TABLET    Take 100 mcg by mouth Daily (before breakfast). LISINOPRIL (PRINIVIL, ZESTRIL) 20 MG TABLET    Take 20 mg by mouth daily. METOPROLOL TARTRATE (LOPRESSOR) 25 MG TABLET    Take 1 Tab by mouth every twelve (12) hours.    These Medications have changed    No medications on file   Stop Taking    No medications on file     _______________________________    Please note that this dictation was completed with [employer], the computer voice recognition software. Quite often unanticipated grammatical, syntax, homophones, and other interpretive errors are inadvertently transcribed by the computer software. Please disregard these errors. Please excuse any errors that have escaped final proofreading.

## 2019-12-18 LAB
CALCULATED R AXIS, ECG10: 77 DEGREES
CALCULATED T AXIS, ECG11: -118 DEGREES
DIAGNOSIS, 93000: NORMAL
Q-T INTERVAL, ECG07: 314 MS
QRS DURATION, ECG06: 62 MS
QTC CALCULATION (BEZET), ECG08: 474 MS
VENTRICULAR RATE, ECG03: 137 BPM

## 2019-12-22 ENCOUNTER — APPOINTMENT (OUTPATIENT)
Dept: GENERAL RADIOLOGY | Age: 59
DRG: 194 | End: 2019-12-22
Attending: PHYSICIAN ASSISTANT
Payer: MEDICAID

## 2019-12-22 ENCOUNTER — HOSPITAL ENCOUNTER (INPATIENT)
Age: 59
LOS: 3 days | Discharge: HOME | DRG: 194 | End: 2019-12-25
Attending: EMERGENCY MEDICINE | Admitting: HOSPITALIST
Payer: MEDICAID

## 2019-12-22 DIAGNOSIS — R06.02 SOB (SHORTNESS OF BREATH): ICD-10-CM

## 2019-12-22 DIAGNOSIS — N17.9 AKI (ACUTE KIDNEY INJURY) (HCC): ICD-10-CM

## 2019-12-22 DIAGNOSIS — R60.9 2+ PITTING EDEMA: ICD-10-CM

## 2019-12-22 DIAGNOSIS — R77.8 ELEVATED TROPONIN: ICD-10-CM

## 2019-12-22 DIAGNOSIS — I16.0 HYPERTENSIVE URGENCY: ICD-10-CM

## 2019-12-22 DIAGNOSIS — I48.91 ATRIAL FIBRILLATION, UNSPECIFIED TYPE (HCC): ICD-10-CM

## 2019-12-22 DIAGNOSIS — I50.9 ACUTE HEART FAILURE, UNSPECIFIED HEART FAILURE TYPE (HCC): Primary | ICD-10-CM

## 2019-12-22 PROBLEM — E66.9 OBESE: Status: ACTIVE | Noted: 2019-12-22

## 2019-12-22 PROBLEM — I50.33 ACUTE ON CHRONIC DIASTOLIC CONGESTIVE HEART FAILURE (HCC): Status: ACTIVE | Noted: 2019-12-22

## 2019-12-22 LAB
ALBUMIN SERPL-MCNC: 3.5 G/DL (ref 3.4–5)
ALBUMIN/GLOB SERPL: 1.3 {RATIO} (ref 0.8–1.7)
ALP SERPL-CCNC: 180 U/L (ref 45–117)
ALT SERPL-CCNC: 67 U/L (ref 13–56)
ANION GAP SERPL CALC-SCNC: 6 MMOL/L (ref 3–18)
AST SERPL-CCNC: 52 U/L (ref 10–38)
BASOPHILS # BLD: 0 K/UL (ref 0–0.1)
BASOPHILS NFR BLD: 0 % (ref 0–2)
BILIRUB SERPL-MCNC: 2 MG/DL (ref 0.2–1)
BNP SERPL-MCNC: 7526 PG/ML (ref 0–900)
BUN SERPL-MCNC: 32 MG/DL (ref 7–18)
BUN/CREAT SERPL: 25 (ref 12–20)
CALCIUM SERPL-MCNC: 9 MG/DL (ref 8.5–10.1)
CHLORIDE SERPL-SCNC: 106 MMOL/L (ref 100–111)
CK MB CFR SERPL CALC: 2 % (ref 0–4)
CK MB SERPL-MCNC: 4.4 NG/ML (ref 5–25)
CK SERPL-CCNC: 224 U/L (ref 26–192)
CO2 SERPL-SCNC: 30 MMOL/L (ref 21–32)
CREAT SERPL-MCNC: 1.26 MG/DL (ref 0.6–1.3)
DIFFERENTIAL METHOD BLD: ABNORMAL
EOSINOPHIL # BLD: 0 K/UL (ref 0–0.4)
EOSINOPHIL NFR BLD: 0 % (ref 0–5)
ERYTHROCYTE [DISTWIDTH] IN BLOOD BY AUTOMATED COUNT: 15.6 % (ref 11.6–14.5)
GLOBULIN SER CALC-MCNC: 2.8 G/DL (ref 2–4)
GLUCOSE SERPL-MCNC: 98 MG/DL (ref 74–99)
HCT VFR BLD AUTO: 42.6 % (ref 35–45)
HGB BLD-MCNC: 13.7 G/DL (ref 12–16)
LYMPHOCYTES # BLD: 1.2 K/UL (ref 0.9–3.6)
LYMPHOCYTES NFR BLD: 13 % (ref 21–52)
MCH RBC QN AUTO: 31.6 PG (ref 24–34)
MCHC RBC AUTO-ENTMCNC: 32.2 G/DL (ref 31–37)
MCV RBC AUTO: 98.2 FL (ref 74–97)
MONOCYTES # BLD: 0.9 K/UL (ref 0.05–1.2)
MONOCYTES NFR BLD: 10 % (ref 3–10)
NEUTS SEG # BLD: 7.2 K/UL (ref 1.8–8)
NEUTS SEG NFR BLD: 77 % (ref 40–73)
PLATELET # BLD AUTO: 178 K/UL (ref 135–420)
PMV BLD AUTO: 13.6 FL (ref 9.2–11.8)
POTASSIUM SERPL-SCNC: 3.3 MMOL/L (ref 3.5–5.5)
PROT SERPL-MCNC: 6.3 G/DL (ref 6.4–8.2)
RBC # BLD AUTO: 4.34 M/UL (ref 4.2–5.3)
SODIUM SERPL-SCNC: 142 MMOL/L (ref 136–145)
T4 FREE SERPL-MCNC: 1.1 NG/DL (ref 0.7–1.5)
TROPONIN I SERPL-MCNC: 0.35 NG/ML (ref 0–0.04)
TROPONIN I SERPL-MCNC: 0.36 NG/ML (ref 0–0.04)
TSH SERPL DL<=0.05 MIU/L-ACNC: 0.84 UIU/ML (ref 0.36–3.74)
WBC # BLD AUTO: 9.4 K/UL (ref 4.6–13.2)

## 2019-12-22 PROCEDURE — 74011000250 HC RX REV CODE- 250: Performed by: PHYSICIAN ASSISTANT

## 2019-12-22 PROCEDURE — 96375 TX/PRO/DX INJ NEW DRUG ADDON: CPT

## 2019-12-22 PROCEDURE — 99285 EMERGENCY DEPT VISIT HI MDM: CPT

## 2019-12-22 PROCEDURE — 71046 X-RAY EXAM CHEST 2 VIEWS: CPT

## 2019-12-22 PROCEDURE — 74011000258 HC RX REV CODE- 258: Performed by: PHYSICIAN ASSISTANT

## 2019-12-22 PROCEDURE — 96374 THER/PROPH/DIAG INJ IV PUSH: CPT

## 2019-12-22 PROCEDURE — 80053 COMPREHEN METABOLIC PANEL: CPT

## 2019-12-22 PROCEDURE — 74011250637 HC RX REV CODE- 250/637: Performed by: EMERGENCY MEDICINE

## 2019-12-22 PROCEDURE — 36415 COLL VENOUS BLD VENIPUNCTURE: CPT

## 2019-12-22 PROCEDURE — 84439 ASSAY OF FREE THYROXINE: CPT

## 2019-12-22 PROCEDURE — 83880 ASSAY OF NATRIURETIC PEPTIDE: CPT

## 2019-12-22 PROCEDURE — 74011250637 HC RX REV CODE- 250/637: Performed by: HOSPITALIST

## 2019-12-22 PROCEDURE — 94761 N-INVAS EAR/PLS OXIMETRY MLT: CPT

## 2019-12-22 PROCEDURE — 93005 ELECTROCARDIOGRAM TRACING: CPT

## 2019-12-22 PROCEDURE — 84484 ASSAY OF TROPONIN QUANT: CPT

## 2019-12-22 PROCEDURE — 84443 ASSAY THYROID STIM HORMONE: CPT

## 2019-12-22 PROCEDURE — 85025 COMPLETE CBC W/AUTO DIFF WBC: CPT

## 2019-12-22 PROCEDURE — 65660000000 HC RM CCU STEPDOWN

## 2019-12-22 PROCEDURE — 74011250636 HC RX REV CODE- 250/636: Performed by: PHYSICIAN ASSISTANT

## 2019-12-22 PROCEDURE — 82550 ASSAY OF CK (CPK): CPT

## 2019-12-22 PROCEDURE — 77030040361 HC SLV COMPR DVT MDII -B

## 2019-12-22 RX ORDER — FUROSEMIDE 10 MG/ML
20 INJECTION INTRAMUSCULAR; INTRAVENOUS
Status: COMPLETED | OUTPATIENT
Start: 2019-12-22 | End: 2019-12-22

## 2019-12-22 RX ORDER — GUAIFENESIN 100 MG/5ML
162 LIQUID (ML) ORAL
Status: COMPLETED | OUTPATIENT
Start: 2019-12-22 | End: 2019-12-22

## 2019-12-22 RX ORDER — METOPROLOL TARTRATE 5 MG/5ML
5 INJECTION INTRAVENOUS
Status: COMPLETED | OUTPATIENT
Start: 2019-12-22 | End: 2019-12-22

## 2019-12-22 RX ORDER — GUAIFENESIN 100 MG/5ML
81 LIQUID (ML) ORAL DAILY
Status: DISCONTINUED | OUTPATIENT
Start: 2019-12-23 | End: 2019-12-25

## 2019-12-22 RX ORDER — LISINOPRIL 20 MG/1
20 TABLET ORAL DAILY
Status: DISCONTINUED | OUTPATIENT
Start: 2019-12-23 | End: 2019-12-24

## 2019-12-22 RX ORDER — FUROSEMIDE 10 MG/ML
60 INJECTION INTRAMUSCULAR; INTRAVENOUS EVERY 12 HOURS
Status: CANCELLED | OUTPATIENT
Start: 2019-12-22 | End: 2019-12-24

## 2019-12-22 RX ORDER — METOPROLOL TARTRATE 5 MG/5ML
5 INJECTION INTRAVENOUS ONCE
Status: COMPLETED | OUTPATIENT
Start: 2019-12-22 | End: 2019-12-22

## 2019-12-22 RX ORDER — ATORVASTATIN CALCIUM 40 MG/1
40 TABLET, FILM COATED ORAL
Status: DISCONTINUED | OUTPATIENT
Start: 2019-12-22 | End: 2019-12-25 | Stop reason: HOSPADM

## 2019-12-22 RX ORDER — METOPROLOL TARTRATE 25 MG/1
25 TABLET, FILM COATED ORAL EVERY 12 HOURS
Status: DISCONTINUED | OUTPATIENT
Start: 2019-12-22 | End: 2019-12-23

## 2019-12-22 RX ORDER — HYDRALAZINE HYDROCHLORIDE 20 MG/ML
10 INJECTION INTRAMUSCULAR; INTRAVENOUS ONCE
Status: COMPLETED | OUTPATIENT
Start: 2019-12-22 | End: 2019-12-22

## 2019-12-22 RX ADMIN — SODIUM CHLORIDE 1 MG/HR: 900 INJECTION, SOLUTION INTRAVENOUS at 20:41

## 2019-12-22 RX ADMIN — ATORVASTATIN CALCIUM 40 MG: 40 TABLET, FILM COATED ORAL at 20:41

## 2019-12-22 RX ADMIN — HYDRALAZINE HYDROCHLORIDE 10 MG: 20 INJECTION INTRAMUSCULAR; INTRAVENOUS at 16:55

## 2019-12-22 RX ADMIN — METOPROLOL TARTRATE 25 MG: 25 TABLET ORAL at 20:41

## 2019-12-22 RX ADMIN — ASPIRIN 81 MG 162 MG: 81 TABLET ORAL at 16:34

## 2019-12-22 RX ADMIN — METOPROLOL TARTRATE 5 MG: 5 INJECTION INTRAVENOUS at 17:38

## 2019-12-22 RX ADMIN — FUROSEMIDE 20 MG: 10 INJECTION, SOLUTION INTRAMUSCULAR; INTRAVENOUS at 17:00

## 2019-12-22 RX ADMIN — METOPROLOL TARTRATE 5 MG: 5 INJECTION INTRAVENOUS at 18:32

## 2019-12-22 NOTE — ED NOTES
I performed a brief history of the patient here in triage and I have determined that pt will need further treatment and evaluation from the main side ER physician. I have placed initial orders based on the history to help in expediting patients care. Patient presents from urgent care with concerns for elevated blood pressure. Patient hypertensive in triage with complaints of abdominal bloating and lower extremity swelling.      Visit Vitals  BP (!) 196/163   Pulse 65   Temp 97.8 °F (36.6 °C)   Resp 18   Ht 5' 5\" (1.651 m)   Wt 106.6 kg (235 lb)   SpO2 96%   BMI 39.11 kg/m²     Odalis Hyman PA-C  2:29 PM

## 2019-12-22 NOTE — ED NOTES
Assume care of patient, patient here 12/17 and refused admission for CHF, patient alert and oriented x 4, skin warm and dry, patient with SOB, lungs wet, patient with mild distress noted at this time, patient placed on monitor, A-fib noted, patient with 3 + pitting edema in bilateral lower legs, POC discussed with patient, will continue to monitor

## 2019-12-22 NOTE — H&P
Internal Medicine History and Physical          Subjective     HPI: Author Ion is a 61 y.o. female with a PMHx HTN, hypothyroid, CAD, and of recent dx of afib who presented to the ED with complaints of worsening dyspnea for the past few days. She was seen at urgent care and sent to the ED for HTN which was initially in the 190s/160s and afib in the 130s. She was seen in the ED dec 17th for ADHF, elevated troponin, rapid afib, /110 and declined admission. She denies a hx of HF but reports new orthopnea, RODGERS, PND, pedal edema, and fatigue. She has a cardiologist referal for new afib dx but has yet to see him. In the ED she is hypervolemic, afib RVR, elevated BNP worse from dec 17th visit. Troponin is better though. Will be admitted for further eval     PMHx:  Past Medical History:   Diagnosis Date    A-fib [facility]) 2018    CAD (coronary artery disease)     A-fib    Hypertension     Hypothyroid        PSurgHx:  Past Surgical History:   Procedure Laterality Date    HX  SECTION         SocialHx:  Social History     Socioeconomic History    Marital status:      Spouse name: Not on file    Number of children: Not on file    Years of education: Not on file    Highest education level: Not on file   Occupational History    Not on file   Social Needs    Financial resource strain: Not on file    Food insecurity:     Worry: Not on file     Inability: Not on file    Transportation needs:     Medical: Not on file     Non-medical: Not on file   Tobacco Use    Smoking status: Never Smoker    Smokeless tobacco: Never Used   Substance and Sexual Activity    Alcohol use:  Yes    Drug use: No    Sexual activity: Not on file   Lifestyle    Physical activity:     Days per week: Not on file     Minutes per session: Not on file    Stress: Not on file   Relationships    Social connections:     Talks on phone: Not on file     Gets together: Not on file     Attends Yazdanism service: Not on file     Active member of club or organization: Not on file     Attends meetings of clubs or organizations: Not on file     Relationship status: Not on file    Intimate partner violence:     Fear of current or ex partner: Not on file     Emotionally abused: Not on file     Physically abused: Not on file     Forced sexual activity: Not on file   Other Topics Concern    Not on file   Social History Narrative    Not on file       Allergies: Allergies   Allergen Reactions    Ampicillin Rash    Penicillins Unknown (comments)     Syncope    Seafood Rash    Shellfish Derived Hives       FamilyHx:  No family history on file. Prior to Admission Medications   Prescriptions Last Dose Informant Patient Reported? Taking?   aspirin 81 mg chewable tablet   No No   Sig: Take 1 Tab by mouth daily. atorvastatin (LIPITOR) 40 mg tablet   No No   Sig: Take 1 Tab by mouth daily. levothyroxine (SYNTHROID) 100 mcg tablet   Yes No   Sig: Take 100 mcg by mouth Daily (before breakfast). lisinopril (PRINIVIL, ZESTRIL) 20 mg tablet   Yes No   Sig: Take 20 mg by mouth daily. metoprolol tartrate (LOPRESSOR) 25 mg tablet   No No   Sig: Take 1 Tab by mouth every twelve (12) hours.       Facility-Administered Medications: None       Review of Systems:  CONST: Fever, weight loss, fatigue or chills  HEENT: Recent changes in vision, vertigo, epistaxis, dysphagia and hoarseness  CV: Chest pain, palpitations, edema and varicosities  RESP: Cough, shortness of breath, wheezing, hemoptysis, snoring and reactive airway disease, dyspnea on exertion, orthopnea, PND  GI: Nausea, vomiting, abdominal pain, change in bowel habits, hematochezia, melena, and GERD   : Hematuria, dysuria, frequency, urgency, nocturia and stress urinary incontinence   MS: Weakness, joint pain and arthritis  ENDO: Polyuria, polydipsia, polyphagia, poor wound healing, heat intolerance, cold intolerance  LYMPH/HEME: Anemia, bruising and history of blood transfusions  INTEG: Dermatitis, abnormal moles  NEURO: Dizziness, headache, fainting, seizures and stroke  PSYCH: Anxiety and depression    A comprehensive review of systems was negative except for that written in the History of Present Illness.     Objective      Visit Vitals  BP (!) 204/114   Pulse (!) 148   Temp 97.8 °F (36.6 °C)   Resp 23   Ht 5' 5\" (1.651 m)   Wt 106.6 kg (235 lb)   SpO2 99%   BMI 39.11 kg/m²       Physical Exam:  General Appearance: NAD, conversant, obese  HENT: normocephalic/atraumatic, moist mucus membranes, Elevated JVP  Lungs: CTA with normal respiratory effort, no wheeze, rales, no crackles  Cardiovascular: IRIR uncontrolled, no m/r/g, capillary refill < 2 sec, B/L DP/PT pulses +3/4  Abdomen: soft, distended, non-tender, active bowel sounds, no guarding or rigidity   Extremities: no cyanosis, 3+ peripheral edema pitting to just below knees bilat, good perfusion bilaterally   Neuro: moves all extremities, no focal deficits, nml tone  Psych: appropriate affect, alert and oriented to person, place and time    Laboratory Studies: All lab results for the last 24 hours reviewed. Imaging Reviewed:  Xr Chest Pa Lat    Result Date: 12/22/2019  EXAM: XR CHEST PA LAT CLINICAL INDICATION/HISTORY: Shortness of breath, hypertension -Additional: None COMPARISON: 12/17/2019 TECHNIQUE: PA and lateral views of the chest _______________ FINDINGS: HEART AND MEDIASTINUM: Enlarged cardiac silhouette redemonstrated with stable mediastinal contours. LUNGS AND PLEURAL SPACES: Mild central vascular congestion. No focal pneumonic consolidation, pneumothorax or pleural effusion BONY THORAX AND SOFT TISSUES: No acute osseous abnormality _______________     IMPRESSION: 1. Cardiac enlargement and mild central vascular congestion without superimposed acute radiographic abnormality.       EKG:   Atrial fibrillation with rapid ventricular response   Nonspecific ST and T wave abnormality   Abnormal ECG   When compared with ECG of 22-DEC-2019 15:21,   No significant change was found     Assessment/Plan     Active Hospital Problems    Diagnosis Date Noted    CHF (congestive heart failure) ([facility] [address] 12/22/2019    Obese 12/22/2019    HTN (hypertension) 09/05/2018    A-fib ([facility] [address] 09/04/2018       ADHF  -Diuresis with gtt   -Strict I/O  -Oxygen as needed, wean as tolerated if needed at all   -BB, ACEi  -ECHO  -NPO at midnight, cardiac diet afterwards  -Cardiology consult  -denies hx of HF. ECHO last year with mild reduced systolic function and mild dilated RV. Suspect worsened with worsened mostly diastolic dysfunction   -Trend troponin, suspect demand related, no signs of ACS    AFIB RVR  -Recently diagnosed, has been taking metoprolol, will resume  -will add Eliquis, discussed with patient, start tomorrow, continue ASA     - Cont acceptable home medications for chronic conditions   - DVT protocol    I have personally reviewed all pertinent labs, films and EKGs that have officially resulted. I reviewed available electronic documentation outlining the initial presentation as well as the emergency room physician's encounter.     Shannan Hermosillo PA-C  Internal Medicine, Hospitalist  Pager: [phone] [facility]

## 2019-12-22 NOTE — ED NOTES
TRANSFER - ED to INPATIENT REPORT:    SBAR report made available to receiving floor on this patient being transferred to  [address] [place] [address] ([facility])  for routine progression of care       Admitting diagnosis CHF (congestive heart failure) ([facility] [address] [I50.9]  HTN (hypertension) [I10]    Information from the following report(s) ED Summary, Procedure Summary, Intake/Output and MAR was made available to receiving floor. Lines:       Medication list unable to confirm    Opportunity for questions and clarification was provided.       Patient is oriented to time, place, person and situation   Patient is  continent and ambulatory without assist     Valuables transported with patient     Patient transported with:   Monitor  Registered Nurse  Tech    MAP (Monitor): 166 =Monitored (most recent)  Vitals w/ MEWS Score (last day)     Date/Time MEWS Score Pulse Resp Temp BP Level of Consciousness SpO2    12/22/19 1740    (!) 125  25    (!) 183/161    99 %    12/22/19 1738    (!) 148      (!) 204/114        12/22/19 1730    (!) 133  26    (!) 178/153    95 %    12/22/19 1720    (!) 132  24    (!) 200/141    98 %    12/22/19 1710    (!) 120  20    (!) 183/127        12/22/19 1655    (!) 106      (!) 193/143        12/22/19 1650    (!) 106  19    (!) 202/140    97 %    12/22/19 1640    (!) 114  16    (!) 193/143    97 %    12/22/19 1630    (!) 110  24    (!) 203/131    100 %    12/22/19 1620    (!) 112  19    (!) 188/165    100 %    12/22/19 1610    (!) 119  19    (!) 188/147    98 %    12/22/19 1550    (!) 109  23    (!) 179/127    99 %    12/22/19 1545    (!) 107  23        98 %    12/22/19 1540    (!) 108  27    (!) 186/151    100 %    12/22/19 1530    (!) 108  21    (!) 189/141    100 %    12/22/19 1520          (!) 183/119    100 %    12/22/19 1424  1  65  18  97.8 °F (36.6 °C)  (!) 196/163  Alert  96 %              Septic Patients:     Lactic Acid  No results found for: LACPOC (Most recent on top)  Repeat drawn: NA Time: NA     ALL LACTIC ACIDS GREATER THAN 2 MUST BE REPEATED POC WITHIN 4 HOURS OR PRIOR TO ADMISSION               Total Fluid Bolus initiated and documented on MAR: NA    All ordered antibiotics initiated within first 3 hours of TIME ZERO?

## 2019-12-22 NOTE — ED PROVIDER NOTES
EMERGENCY DEPARTMENT HISTORY AND PHYSICAL EXAM    Date: 12/22/2019  Patient Name: Seble Taylor    History of Presenting Illness     Chief Complaint   Patient presents with    Hypertension         History Provided By: Patient    Chief Complaint: shortness of breath  Duration: 1 Weeks  Timing:  Gradual, Intermittent and Worsening  Location: global  Quality: Pressure and Tightness  Severity: Moderate  Modifying Factors: worse w/ mild exertion  Associated Symptoms: bilateral leg swelling, increased fatigue, shortness of breath, feeling bloated      HPI: Seble Taylor is a 61 y.o. female with a PMH of HTN, Atrial Fibrillation, CAD, hypothyroid who presents to the ER from urgent care for further evaluation complaining of shortness of breath, bilateral lower leg swelling and increased fatigue. Patient states her symptoms have been going on for over 1 week. She was previously seen in the emergency department and discharged home with primary care follow-up however has not seen her doctor yet. Patient states her shortness of breath is worse with mild exertion. She also reports feeling bloated in her lower abdomen but has had normal bowel movements. She has been taking all her cardiac medications as prescribed and has not had any chest pain. Patient denies any recent fevers and has no other symptoms or complaints. PCP: Juan Ascencio MD    Current Facility-Administered Medications   Medication Dose Route Frequency Provider Last Rate Last Dose    metoprolol (LOPRESSOR) injection 5 mg  5 mg IntraVENous NOW Nanda Prado PA-C         Current Outpatient Medications   Medication Sig Dispense Refill    aspirin 81 mg chewable tablet Take 1 Tab by mouth daily. 30 Tab 0    atorvastatin (LIPITOR) 40 mg tablet Take 1 Tab by mouth daily. 30 Tab 0    metoprolol tartrate (LOPRESSOR) 25 mg tablet Take 1 Tab by mouth every twelve (12) hours.  60 Tab 0    lisinopril (PRINIVIL, ZESTRIL) 20 mg tablet Take 20 mg by mouth daily.  levothyroxine (SYNTHROID) 100 mcg tablet Take 100 mcg by mouth Daily (before breakfast). Past History     Past Medical History:  Past Medical History:   Diagnosis Date    A-fib [facility]) 2018    CAD (coronary artery disease)     A-fib    Hypertension     Hypothyroid        Past Surgical History:  Past Surgical History:   Procedure Laterality Date    HX  SECTION         Family History:  No family history on file. Social History:  Social History     Tobacco Use    Smoking status: Never Smoker    Smokeless tobacco: Never Used   Substance Use Topics    Alcohol use: Yes    Drug use: No       Allergies: Allergies   Allergen Reactions    Ampicillin Rash    Penicillins Unknown (comments)     Syncope    Seafood Rash    Shellfish Derived Hives         Review of Systems   Review of Systems   Constitutional: Positive for fatigue. Negative for chills and fever. HENT: Negative. Negative for sore throat. Eyes: Negative. Respiratory: Positive for shortness of breath. Negative for cough. Cardiovascular: Positive for leg swelling. Negative for chest pain and palpitations. Gastrointestinal: Negative for abdominal pain, nausea and vomiting. Genitourinary: Negative for dysuria. Musculoskeletal: Negative. Skin: Negative. Neurological: Negative for dizziness, weakness, light-headedness and headaches. Psychiatric/Behavioral: Negative. All other systems reviewed and are negative. Physical Exam     Vitals:    19 1540 19 1545 19 1550 19 1655   BP: (!) 186/151  (!) 179/127 (!) 193/143   Pulse: (!) 108 (!) 107 (!) 109 (!) 106   Resp: 27 23 23    Temp:       SpO2: 100% 98% 99%    Weight:       Height:         Physical Exam  Vitals signs and nursing note reviewed. Constitutional:       General: She is not in acute distress. Appearance: She is well-developed. HENT:      Head: Normocephalic and atraumatic.       Mouth/Throat: Mouth: Mucous membranes are moist.   Eyes:      General: No scleral icterus. Conjunctiva/sclera: Conjunctivae normal.   Neck:      Musculoskeletal: Normal range of motion and neck supple. Vascular: No JVD. Trachea: No tracheal deviation. Cardiovascular:      Rate and Rhythm: Normal rate and regular rhythm. Pulses: Normal pulses. Heart sounds: Normal heart sounds. No murmur. Pulmonary:      Effort: Pulmonary effort is normal. No respiratory distress. Breath sounds: Normal breath sounds. No stridor. No wheezing, rhonchi or rales. Comments: Speaks in full sentences  Chest:      Chest wall: No tenderness. Abdominal:      General: Bowel sounds are normal. There is no distension. Palpations: Abdomen is soft. Tenderness: There is no tenderness. There is no guarding or rebound. Musculoskeletal: Normal range of motion. General: Swelling present. No tenderness. Right lower leg: Edema present. Left lower leg: Edema present. Comments: 2+ pitting edema in BLLE extending to just below knees   Skin:     General: Skin is warm and dry. Capillary Refill: Capillary refill takes less than 2 seconds. Neurological:      Mental Status: She is alert and oriented to person, place, and time. GCS: GCS eye subscore is 4. GCS verbal subscore is 5. GCS motor subscore is 6.       Gait: Gait normal.           Diagnostic Study Results     Labs -     Recent Results (from the past 12 hour(s))   EKG, 12 LEAD, INITIAL    Collection Time: 12/22/19  3:21 PM   Result Value Ref Range    Ventricular Rate 105 BPM    QRS Duration 68 ms    Q-T Interval 350 ms    QTC Calculation (Bezet) 462 ms    Calculated R Axis 74 degrees    Calculated T Axis -109 degrees    Diagnosis       Atrial fibrillation with rapid ventricular response  Nonspecific ST and T wave abnormality  Abnormal ECG  When compared with ECG of 17-DEC-2019 17:29,  Nonspecific T wave abnormality no longer evident in Anterior leads     CBC WITH AUTOMATED DIFF    Collection Time: 12/22/19  3:46 PM   Result Value Ref Range    WBC 9.4 4.6 - 13.2 K/uL    RBC 4.34 4.20 - 5.30 M/uL    HGB 13.7 12.0 - 16.0 g/dL    HCT 42.6 35.0 - 45.0 %    MCV 98.2 (H) 74.0 - 97.0 FL    MCH 31.6 24.0 - 34.0 PG    MCHC 32.2 31.0 - 37.0 g/dL    RDW 15.6 (H) 11.6 - 14.5 %    PLATELET 502 779 - 108 K/uL    MPV 13.6 (H) 9.2 - 11.8 FL    NEUTROPHILS 77 (H) 40 - 73 %    LYMPHOCYTES 13 (L) 21 - 52 %    MONOCYTES 10 3 - 10 %    EOSINOPHILS 0 0 - 5 %    BASOPHILS 0 0 - 2 %    ABS. NEUTROPHILS 7.2 1.8 - 8.0 K/UL    ABS. LYMPHOCYTES 1.2 0.9 - 3.6 K/UL    ABS. MONOCYTES 0.9 0.05 - 1.2 K/UL    ABS. EOSINOPHILS 0.0 0.0 - 0.4 K/UL    ABS. BASOPHILS 0.0 0.0 - 0.1 K/UL    DF AUTOMATED     METABOLIC PANEL, COMPREHENSIVE    Collection Time: 12/22/19  3:46 PM   Result Value Ref Range    Sodium 142 136 - 145 mmol/L    Potassium 3.3 (L) 3.5 - 5.5 mmol/L    Chloride 106 100 - 111 mmol/L    CO2 30 21 - 32 mmol/L    Anion gap 6 3.0 - 18 mmol/L    Glucose 98 74 - 99 mg/dL    BUN 32 (H) 7.0 - 18 MG/DL    Creatinine 1.26 0.6 - 1.3 MG/DL    BUN/Creatinine ratio 25 (H) 12 - 20      GFR est AA 53 (L) >60 ml/min/1.73m2    GFR est non-AA 43 (L) >60 ml/min/1.73m2    Calcium 9.0 8.5 - 10.1 MG/DL    Bilirubin, total 2.0 (H) 0.2 - 1.0 MG/DL    ALT (SGPT) 67 (H) 13 - 56 U/L    AST (SGOT) 52 (H) 10 - 38 U/L    Alk.  phosphatase 180 (H) 45 - 117 U/L    Protein, total 6.3 (L) 6.4 - 8.2 g/dL    Albumin 3.5 3.4 - 5.0 g/dL    Globulin 2.8 2.0 - 4.0 g/dL    A-G Ratio 1.3 0.8 - 1.7     NT-PRO BNP    Collection Time: 12/22/19  3:46 PM   Result Value Ref Range    NT pro-BNP 7,526 (H) 0 - 900 PG/ML   TROPONIN I    Collection Time: 12/22/19  3:46 PM   Result Value Ref Range    Troponin-I, QT 0.36 (H) 0.0 - 0.045 NG/ML   TSH 3RD GENERATION    Collection Time: 12/22/19  3:46 PM   Result Value Ref Range    TSH 0.84 0.36 - 3.74 uIU/mL   EKG, 12 LEAD, SUBSEQUENT    Collection Time: 12/22/19  4:40 PM   Result Value Ref Range    Ventricular Rate 107 BPM    QRS Duration 72 ms    Q-T Interval 336 ms    QTC Calculation (Bezet) 448 ms    Calculated R Axis 71 degrees    Calculated T Axis -110 degrees    Diagnosis       Atrial fibrillation with rapid ventricular response  Nonspecific ST and T wave abnormality  Abnormal ECG  When compared with ECG of 22-DEC-2019 15:21,  No significant change was found         Radiologic Studies -   XR CHEST PA LAT   Final Result   IMPRESSION:         1. Cardiac enlargement and mild central vascular congestion without superimposed   acute radiographic abnormality. CT Results  (Last 48 hours)    None        CXR Results  (Last 48 hours)               12/22/19 1456  XR CHEST PA LAT Final result    Impression:  IMPRESSION:           1. Cardiac enlargement and mild central vascular congestion without superimposed   acute radiographic abnormality. Narrative:  EXAM: XR CHEST PA LAT       CLINICAL INDICATION/HISTORY: Shortness of breath, hypertension   -Additional: None       COMPARISON: 12/17/2019       TECHNIQUE: PA and lateral views of the chest       _______________       FINDINGS:       HEART AND MEDIASTINUM: Enlarged cardiac silhouette redemonstrated with stable   mediastinal contours. LUNGS AND PLEURAL SPACES: Mild central vascular congestion. No focal pneumonic   consolidation, pneumothorax or pleural effusion       BONY THORAX AND SOFT TISSUES: No acute osseous abnormality       _______________                   Medical Decision Making   I am the first provider for this patient. I reviewed the vital signs, available nursing notes, past medical history, past surgical history, family history and social history. Vital Signs-Reviewed the patient's vital signs.     Records Reviewed: Nursing Notes, Old Medical Records, Previous electrocardiograms, Previous Radiology Studies and Previous Laboratory Studies     114 PM  70-year-old female who presents the ER from urgent care complaining of hypertension, shortness of breath and bilateral lower leg swelling. Symptom onset over 1 week ago. Was just seen several days prior for same symptoms however has not seen her primary care or cardiologist since then. Does not take any fluid pills. Has been taking her metoprolol as prescribed. Denied any reports of headache or dizziness or chest pain. Shortness of breath with mild exertion; able to speak in full sentences on exam while at rest.  Noted to have 2+ pitting edema in bilateral lower extremities. We will plan on EKG and further cardiac work-up at this time. Previous cardiac work up 5 days prior showed BNP above 6000, normal duplex of lower extremities and LEONOR w/ mildly elevated trop. Lanre Rose PA-C     3:26 PM  EKG shows Atrial Fibrillation, rate 105 w/ nonspecific ST/T wave abnormality noted; compared to previous EKG's, no change noted. Lanre Rose PA-C    5:27 PM  Patient noted to have elevated troponin however this has decreased and improved from ER visit 5 days ago. BNP noted to be elevated from previous labs. Chest x-ray consistent with mild fluid overload. Symptoms, physical exam findings and labs consistent with acute congestive heart failure. Patient given Lasix, hydralazine for hypertension and fluid overload. Discussed patient with Dr. Vladimir Ibrahim, hospitalist who agrees with admission at this time. Patient made aware of pending admission at this time.   Lanre Rose PA-C    Disposition:  Admitted    CRITICAL CARE NOTE :    5:29 PM      IMPENDING DETERIORATION -Cardiovascular    ASSOCIATED RISK FACTORS - Hypotension and Dysrhythmia    MANAGEMENT- Bedside Assessment and Supervision of Care    INTERPRETATION -  Xrays, ECG and Blood Pressure    INTERVENTIONS - hemodynamic mngmt    CASE REVIEW - Hospitalist    TREATMENT RESPONSE -Stable    PERFORMED BY - Self        NOTES   :      I have spent 40 minutes of critical care time involved in lab review, consultations with specialist, family decision- making, bedside attention and documentation. During this entire length of time I was immediately available to the patient . Shira Minaya PA-C      Follow-up Information    None         Current Discharge Medication List          Provider Notes (Medical Decision Making):     Procedures:  Procedures        Diagnosis     Clinical Impression:   1. Acute heart failure, unspecified heart failure type ([facility] [address]    2. Hypertensive urgency    3. SOB (shortness of breath)    4. 2+ pitting edema    5. Elevated troponin    6. LEONOR (acute kidney injury) ([facility] [address]    7.  Atrial fibrillation, unspecified type ([facility] [address]

## 2019-12-22 NOTE — PROGRESS NOTES
1810 -- Report from Wong Bell RN given to Joseph 1960 -- Patient on the unit, cardiac monitor applied, Req doc completed, no pain or SOB noted, IV flushed and capped, patient up to the bathroom. Bedside shift change report given to INDIRA Turcios (oncoming nurse) by Leticia Jarquin RN (offgoing nurse). Report included the following information SBAR, Kardex, Intake/Output, MAR and Recent Results.

## 2019-12-23 ENCOUNTER — APPOINTMENT (OUTPATIENT)
Dept: NON INVASIVE DIAGNOSTICS | Age: 59
DRG: 194 | End: 2019-12-23
Attending: HOSPITALIST
Payer: MEDICAID

## 2019-12-23 LAB
ANION GAP SERPL CALC-SCNC: 8 MMOL/L (ref 3–18)
BASOPHILS # BLD: 0 K/UL (ref 0–0.1)
BASOPHILS NFR BLD: 0 % (ref 0–2)
BUN SERPL-MCNC: 29 MG/DL (ref 7–18)
BUN/CREAT SERPL: 26 (ref 12–20)
CALCIUM SERPL-MCNC: 10 MG/DL (ref 8.5–10.1)
CALCULATED R AXIS, ECG10: 71 DEGREES
CALCULATED R AXIS, ECG10: 74 DEGREES
CALCULATED T AXIS, ECG11: -109 DEGREES
CALCULATED T AXIS, ECG11: -110 DEGREES
CHLORIDE SERPL-SCNC: 103 MMOL/L (ref 100–111)
CK MB CFR SERPL CALC: 1.2 % (ref 0–4)
CK MB CFR SERPL CALC: 1.7 % (ref 0–4)
CK MB SERPL-MCNC: 2.5 NG/ML (ref 5–25)
CK MB SERPL-MCNC: 4.5 NG/ML (ref 5–25)
CK SERPL-CCNC: 210 U/L (ref 26–192)
CK SERPL-CCNC: 259 U/L (ref 26–192)
CO2 SERPL-SCNC: 33 MMOL/L (ref 21–32)
CREAT SERPL-MCNC: 1.12 MG/DL (ref 0.6–1.3)
DIAGNOSIS, 93000: NORMAL
DIAGNOSIS, 93000: NORMAL
DIFFERENTIAL METHOD BLD: ABNORMAL
ECHO AO ASC DIAM: 3.04 CM
ECHO AO ROOT DIAM: 2.76 CM
ECHO IVC SNIFF: 2.8 CM
ECHO LA MAJOR AXIS: 4.53 CM
ECHO LA TO AORTIC ROOT RATIO: 1.64
ECHO LV EDV A2C: 96.6 ML
ECHO LV EDV A4C: 109.4 ML
ECHO LV EDV BP: 103 ML (ref 56–104)
ECHO LV EDV INDEX A4C: 49 ML/M2
ECHO LV EDV INDEX BP: 46.1 ML/M2
ECHO LV EDV NDEX A2C: 43.3 ML/M2
ECHO LV EDV TEICHHOLZ: 0.45 ML
ECHO LV EJECTION FRACTION A2C: 29 %
ECHO LV EJECTION FRACTION A4C: 25 %
ECHO LV EJECTION FRACTION BIPLANE: 26.5 % (ref 55–100)
ECHO LV ESV A2C: 68.4 ML
ECHO LV ESV A4C: 82.1 ML
ECHO LV ESV BP: 75.7 ML (ref 19–49)
ECHO LV ESV INDEX A2C: 30.6 ML/M2
ECHO LV ESV INDEX A4C: 36.8 ML/M2
ECHO LV ESV INDEX BP: 33.9 ML/M2
ECHO LV ESV TEICHHOLZ: 0.18 ML
ECHO LV INTERNAL DIMENSION DIASTOLIC: 4.1 CM (ref 3.9–5.3)
ECHO LV INTERNAL DIMENSION SYSTOLIC: 2.84 CM
ECHO LV IVSD: 1.5 CM (ref 0.6–0.9)
ECHO LV MASS 2D: 252.8 G (ref 67–162)
ECHO LV MASS INDEX 2D: 113.2 G/M2 (ref 43–95)
ECHO LV POSTERIOR WALL DIASTOLIC: 1.28 CM (ref 0.6–0.9)
ECHO LVOT DIAM: 1.94 CM
ECHO LVOT PEAK GRADIENT: 2.6 MMHG
ECHO LVOT PEAK VELOCITY: 80.47 CM/S
ECHO LVOT SV: 39.4 ML
ECHO LVOT VTI: 13.32 CM
ECHO PULMONARY ARTERY SYSTOLIC PRESSURE (PASP): 55.6 MMHG
ECHO RA MINOR AXIS: 3.01 CM
ECHO TV REGURGITANT MAX VELOCITY: 319 CM/S
ECHO TV REGURGITANT PEAK GRADIENT: 40.6 MMHG
EOSINOPHIL # BLD: 0.1 K/UL (ref 0–0.4)
EOSINOPHIL NFR BLD: 1 % (ref 0–5)
ERYTHROCYTE [DISTWIDTH] IN BLOOD BY AUTOMATED COUNT: 15.7 % (ref 11.6–14.5)
GLUCOSE SERPL-MCNC: 88 MG/DL (ref 74–99)
HCT VFR BLD AUTO: 45.8 % (ref 35–45)
HGB BLD-MCNC: 14.4 G/DL (ref 12–16)
LVFS 2D: 30.83 %
LVOT MG: 1.55 MMHG
LVOT MV: 0.58 CM/S
LVSV (MOD BI): 11.6 ML
LVSV (MOD SINGLE 4C): 11.61 ML
LVSV (MOD SINGLE): 11.99 ML
LVSV (TEICH): 18.59 ML
LYMPHOCYTES # BLD: 1.7 K/UL (ref 0.9–3.6)
LYMPHOCYTES NFR BLD: 17 % (ref 21–52)
MAGNESIUM SERPL-MCNC: 1.2 MG/DL (ref 1.6–2.6)
MCH RBC QN AUTO: 30.9 PG (ref 24–34)
MCHC RBC AUTO-ENTMCNC: 31.4 G/DL (ref 31–37)
MCV RBC AUTO: 98.3 FL (ref 74–97)
MONOCYTES # BLD: 0.8 K/UL (ref 0.05–1.2)
MONOCYTES NFR BLD: 9 % (ref 3–10)
NEUTS SEG # BLD: 7.3 K/UL (ref 1.8–8)
NEUTS SEG NFR BLD: 73 % (ref 40–73)
PLATELET # BLD AUTO: 186 K/UL (ref 135–420)
PMV BLD AUTO: 13.4 FL (ref 9.2–11.8)
POTASSIUM SERPL-SCNC: 3 MMOL/L (ref 3.5–5.5)
Q-T INTERVAL, ECG07: 336 MS
Q-T INTERVAL, ECG07: 350 MS
QRS DURATION, ECG06: 68 MS
QRS DURATION, ECG06: 72 MS
QTC CALCULATION (BEZET), ECG08: 448 MS
QTC CALCULATION (BEZET), ECG08: 462 MS
RBC # BLD AUTO: 4.66 M/UL (ref 4.2–5.3)
SODIUM SERPL-SCNC: 144 MMOL/L (ref 136–145)
TROPONIN I SERPL-MCNC: 0.37 NG/ML (ref 0–0.04)
TROPONIN I SERPL-MCNC: 0.38 NG/ML (ref 0–0.04)
VENTRICULAR RATE, ECG03: 105 BPM
VENTRICULAR RATE, ECG03: 107 BPM
WBC # BLD AUTO: 9.8 K/UL (ref 4.6–13.2)

## 2019-12-23 PROCEDURE — 93306 TTE W/DOPPLER COMPLETE: CPT

## 2019-12-23 PROCEDURE — 83735 ASSAY OF MAGNESIUM: CPT

## 2019-12-23 PROCEDURE — 74011250637 HC RX REV CODE- 250/637: Performed by: PHYSICIAN ASSISTANT

## 2019-12-23 PROCEDURE — 74011000250 HC RX REV CODE- 250: Performed by: INTERNAL MEDICINE

## 2019-12-23 PROCEDURE — 65660000000 HC RM CCU STEPDOWN

## 2019-12-23 PROCEDURE — 74011000250 HC RX REV CODE- 250: Performed by: PHYSICIAN ASSISTANT

## 2019-12-23 PROCEDURE — 74011250636 HC RX REV CODE- 250/636: Performed by: INTERNAL MEDICINE

## 2019-12-23 PROCEDURE — C9113 INJ PANTOPRAZOLE SODIUM, VIA: HCPCS | Performed by: INTERNAL MEDICINE

## 2019-12-23 PROCEDURE — 74011250637 HC RX REV CODE- 250/637: Performed by: HOSPITALIST

## 2019-12-23 PROCEDURE — 74011000258 HC RX REV CODE- 258: Performed by: PHYSICIAN ASSISTANT

## 2019-12-23 PROCEDURE — 80048 BASIC METABOLIC PNL TOTAL CA: CPT

## 2019-12-23 PROCEDURE — 82550 ASSAY OF CK (CPK): CPT

## 2019-12-23 PROCEDURE — 36415 COLL VENOUS BLD VENIPUNCTURE: CPT

## 2019-12-23 PROCEDURE — 85025 COMPLETE CBC W/AUTO DIFF WBC: CPT

## 2019-12-23 PROCEDURE — 74011250637 HC RX REV CODE- 250/637: Performed by: INTERNAL MEDICINE

## 2019-12-23 RX ORDER — METOPROLOL TARTRATE 50 MG/1
50 TABLET ORAL EVERY 12 HOURS
Status: DISCONTINUED | OUTPATIENT
Start: 2019-12-23 | End: 2019-12-25 | Stop reason: HOSPADM

## 2019-12-23 RX ORDER — PANTOPRAZOLE SODIUM 40 MG/10ML
40 INJECTION, POWDER, LYOPHILIZED, FOR SOLUTION INTRAVENOUS
Status: COMPLETED | OUTPATIENT
Start: 2019-12-23 | End: 2019-12-23

## 2019-12-23 RX ORDER — DIGOXIN 0.25 MG/ML
250 INJECTION INTRAMUSCULAR; INTRAVENOUS
Status: COMPLETED | OUTPATIENT
Start: 2019-12-23 | End: 2019-12-23

## 2019-12-23 RX ORDER — BUMETANIDE 0.25 MG/ML
1 INJECTION INTRAMUSCULAR; INTRAVENOUS ONCE
Status: DISCONTINUED | OUTPATIENT
Start: 2019-12-23 | End: 2019-12-23

## 2019-12-23 RX ORDER — METOPROLOL TARTRATE 5 MG/5ML
2.5 INJECTION INTRAVENOUS
Status: DISCONTINUED | OUTPATIENT
Start: 2019-12-23 | End: 2019-12-25 | Stop reason: HOSPADM

## 2019-12-23 RX ORDER — DIGOXIN 125 MCG
0.12 TABLET ORAL DAILY
Status: DISCONTINUED | OUTPATIENT
Start: 2019-12-24 | End: 2019-12-25 | Stop reason: HOSPADM

## 2019-12-23 RX ORDER — BUMETANIDE 0.25 MG/ML
1 INJECTION INTRAMUSCULAR; INTRAVENOUS 2 TIMES DAILY
Status: DISCONTINUED | OUTPATIENT
Start: 2019-12-23 | End: 2019-12-24

## 2019-12-23 RX ORDER — POTASSIUM CHLORIDE 20 MEQ/1
40 TABLET, EXTENDED RELEASE ORAL EVERY 4 HOURS
Status: COMPLETED | OUTPATIENT
Start: 2019-12-23 | End: 2019-12-23

## 2019-12-23 RX ADMIN — METOPROLOL TARTRATE 2.5 MG: 5 INJECTION INTRAVENOUS at 20:15

## 2019-12-23 RX ADMIN — LISINOPRIL 20 MG: 20 TABLET ORAL at 08:16

## 2019-12-23 RX ADMIN — APIXABAN 5 MG: 5 TABLET, FILM COATED ORAL at 17:16

## 2019-12-23 RX ADMIN — METOPROLOL TARTRATE 50 MG: 50 TABLET, FILM COATED ORAL at 22:02

## 2019-12-23 RX ADMIN — LIDOCAINE HYDROCHLORIDE 40 ML: 20 SOLUTION ORAL; TOPICAL at 22:03

## 2019-12-23 RX ADMIN — LEVOTHYROXINE SODIUM 100 MCG: 75 TABLET ORAL at 06:00

## 2019-12-23 RX ADMIN — APIXABAN 5 MG: 5 TABLET, FILM COATED ORAL at 08:16

## 2019-12-23 RX ADMIN — METOPROLOL TARTRATE 25 MG: 25 TABLET ORAL at 08:16

## 2019-12-23 RX ADMIN — BUMETANIDE 1 MG: 0.25 INJECTION INTRAMUSCULAR; INTRAVENOUS at 17:39

## 2019-12-23 RX ADMIN — ATORVASTATIN CALCIUM 40 MG: 40 TABLET, FILM COATED ORAL at 22:01

## 2019-12-23 RX ADMIN — SODIUM CHLORIDE 1 MG/HR: 900 INJECTION, SOLUTION INTRAVENOUS at 04:45

## 2019-12-23 RX ADMIN — PANTOPRAZOLE SODIUM 40 MG: 40 INJECTION, POWDER, FOR SOLUTION INTRAVENOUS at 22:04

## 2019-12-23 RX ADMIN — ASPIRIN 81 MG 81 MG: 81 TABLET ORAL at 08:16

## 2019-12-23 RX ADMIN — POTASSIUM CHLORIDE 40 MEQ: 1500 TABLET, EXTENDED RELEASE ORAL at 08:16

## 2019-12-23 RX ADMIN — POTASSIUM CHLORIDE 40 MEQ: 1500 TABLET, EXTENDED RELEASE ORAL at 12:56

## 2019-12-23 RX ADMIN — DIGOXIN 250 MCG: 0.25 INJECTION INTRAMUSCULAR; INTRAVENOUS at 14:09

## 2019-12-23 NOTE — PROGRESS NOTES
conducted an initial consultation and Spiritual Assessment for Courtney Wright, who is a 61 y.o.,female. Patients Primary Language is: [place]. According to the patients EMR Latter-day Affiliation is: No preference. The reason the Patient came to the hospital is:   Patient Active Problem List    Diagnosis Date Noted    Acute on chronic diastolic congestive heart failure ([facility] [address] 12/22/2019    Obese 12/22/2019    HTN (hypertension) 09/05/2018    Acquired hypothyroidism 09/05/2018    Atypical chest pain 09/04/2018    A-fib ([facility] [address] 09/04/2018        The  provided the following Interventions:  Initiated a relationship of care and support. Explored issues of jonathan, spirituality and/or Oriental orthodox needs while hospitalized. Listened empathically. Provided chaplaincy education. Provided information about Spiritual Care Services. Offered prayer and assurance of continued prayers on patient's behalf. Chart reviewed. The following outcomes were achieved:  Patient shared some information about their medical narrative and spiritual journey/beliefs. Patient processed feeling about current hospitalization. Patient expressed gratitude for the 's visit. Assessment:  Patient did not indicate any spiritual or Oriental orthodox issues which require Spiritual Care Services interventions at this time. Patient does not have any Oriental orthodox/cultural needs that will affect patients preferences in health care. Plan:  Chaplains will continue to follow and will provide pastoral care on an as needed or requested basis.  recommends bedside caregivers page  on duty if patient shows signs of acute spiritual or emotional distress.     [address] [facility]   Staff [address] [facility]   [phone]

## 2019-12-23 NOTE — PROGRESS NOTES
Bedside shift report received from [address] [place] [address]: AOX4, on room air, resting in bed making a phone call; denies any chest pain or other discomforts at this time; shift assessment completed    1930: pharmacy called; Bumex drip being prepared    2041: meds given as ordered; discussed rationale of meds    2300: sleeping; awake at intervals; call bell within reach; Bumex Drip infusing well    0130: up to bedside commode    0307: resting in bed; Bumex drip infusing well    0600: awake; no complaints voiced; Bumex drip infusing well    0705: Bedside and Verbal shift change report given to Amy Spencer 1 (oncoming nurse) by Eric De La Cruz RN (offgoing nurse). Report included the following information SBAR, Kardex, Intake/Output, Recent Results and Cardiac Rhythm Afib.

## 2019-12-23 NOTE — PROGRESS NOTES
Internal Medicine Progress Note    Patient's Name: Jacobo Nurse  Admit Date: 12/22/2019  Length of Stay: 1      Assessment/Plan     Principal Problem:    Acute on chronic diastolic congestive heart failure ([facility] [address] (12/22/2019)    Active Problems:    A-fib ([facility] [address] (9/4/2018)      HTN (hypertension) (9/5/2018)      Obese (12/22/2019)      ADHF  -d/c gtt, bumex dose tonight, net negative since admission  -Strict I/O  -Oxygen as needed, wean as tolerated if needed at all   -BB, ACEi  -ECHO  -Cardiac diet  -Cardiology consult  -denies hx of HF. ECHO last year with mild reduced systolic function and mild dilated RV. Suspect worsened with worsened mostly diastolic dysfunction   -Trend troponin, suspect demand related, no signs of ACS. Remained flat     AFIB RVR  -Recently diagnosed, has been taking metoprolol, will resume  -will add Eliquis, discussed with patient, continue ASA  -Chadvasc 4  -Rates improved, cardiology consulted     - Cont acceptable home medications for chronic conditions   - DVT protocol    I have personally reviewed all pertinent labs and films that have officially resulted over the last 24 hours. I have personally checked for all pending labs that are awaiting final results. Interval History   \"Courtney Adams is a 61 y.o. female with a PMHx HTN, hypothyroid, CAD, and of recent dx of afib who presented to the ED with complaints of worsening dyspnea for the past few days. She was seen at urgent care and sent to the ED for HTN which was initially in the 190s/160s and afib in the 130s. She was seen in the ED dec 17th for ADHF, elevated troponin, rapid afib, /110 and declined admission. She denies a hx of HF but reports new orthopnea, RODGERS, PND, pedal edema, and fatigue. She has a cardiologist referal for new afib dx but has yet to see him.          In the ED she is hypervolemic, afib RVR, elevated BNP worse from dec 17th visit. Troponin is better though.   Will be admitted for further eval \"    ADHF treated with BB, ACEi, and bumex gtt. Net negative since admission with improvement of symptoms. Cardiology consulted - she was started on eliquis for recent diagnoses of Afib with RVR. Risks/benefits discussed. Subjective     Pt s/e @ bedside. No major events overnight. Pt offers no complaints this AM.  Peripheral edema improved, dyspnea and gut distension improved    Objective     Visit Vitals  BP (!) 142/97   Pulse (!) 118   Temp 97 °F (36.1 °C)   Resp 29   Ht 5' 5\" (1.651 m)   Wt 120.7 kg (266 lb)   SpO2 94%   BMI 44.26 kg/m²       Physical Exam:  General Appearance: NAD, conversant, obese  HENT: normocephalic/atraumatic, moist mucus membranes  Neck: No JVD, supple  Lungs: CTA with normal respiratory effort  CV: RRR, no m/r/g  Abdomen: soft, non-tender, normal bowel sounds, non distended  Extremities: no cyanosis, LLE 1+ peripheral edema, RLE 2+ edema, improved from yesterday   Neuro: No focal deficits, motor/sensory intact      Intake and Output:  Current Shift:  12/23 0701 - 12/23 1900  In: 240 [P.O.:240]  Out: 2600 [Urine:2600]  Last three shifts:  12/21 1901 - 12/23 0700  In: 322.4 [P.O.:240; I.V.:82.4]  Out: 5450 [Urine:5450]    Lab/Data Reviewed: All lab results for the last 24 hours reviewed. Imaging Reviewed:  Xr Chest Pa Lat    Result Date: 12/22/2019  EXAM: XR CHEST PA LAT CLINICAL INDICATION/HISTORY: Shortness of breath, hypertension -Additional: None COMPARISON: 12/17/2019 TECHNIQUE: PA and lateral views of the chest _______________ FINDINGS: HEART AND MEDIASTINUM: Enlarged cardiac silhouette redemonstrated with stable mediastinal contours. LUNGS AND PLEURAL SPACES: Mild central vascular congestion. No focal pneumonic consolidation, pneumothorax or pleural effusion BONY THORAX AND SOFT TISSUES: No acute osseous abnormality _______________     IMPRESSION: 1.  Cardiac enlargement and mild central vascular congestion without superimposed acute radiographic abnormality.       Medications Reviewed:  Current Facility-Administered Medications   Medication Dose Route Frequency    potassium chloride (K-DUR, KLOR-CON) SR tablet 40 mEq  40 mEq Oral Q4H    bumetanide (BUMEX) injection 1 mg  1 mg IntraVENous ONCE    aspirin chewable tablet 81 mg  81 mg Oral DAILY    atorvastatin (LIPITOR) tablet 40 mg  40 mg Oral QHS    levothyroxine (SYNTHROID) tablet 100 mcg  100 mcg Oral 6am    lisinopril (PRINIVIL, ZESTRIL) tablet 20 mg  20 mg Oral DAILY    metoprolol tartrate (LOPRESSOR) tablet 25 mg  25 mg Oral Q12H    apixaban (ELIQUIS) tablet 5 mg  5 mg Oral BID         RONDA Patterson [facility]  Hospitalist Division  Pager: [phone]  Office: [phone]

## 2019-12-23 NOTE — PROGRESS NOTES
Problem: Heart Failure: Day 2  Goal: Off Pathway (Use only if patient is Off Pathway)  Outcome: Progressing Towards Goal  Goal: Activity/Safety  Outcome: Progressing Towards Goal  Goal: Consults, if ordered  Outcome: Progressing Towards Goal  Goal: Diagnostic Test/Procedures  Outcome: Progressing Towards Goal  Goal: Nutrition/Diet  Outcome: Progressing Towards Goal  Goal: Discharge Planning  Outcome: Progressing Towards Goal  Goal: Medications  Outcome: Progressing Towards Goal  Goal: Respiratory  Outcome: Progressing Towards Goal  Goal: Treatments/Interventions/Procedures  Outcome: Progressing Towards Goal  Goal: Psychosocial  Outcome: Progressing Towards Goal  Goal: *Oxygen saturation within defined limits  Outcome: Progressing Towards Goal  Goal: *Hemodynamically stable  Outcome: Progressing Towards Goal  Goal: *Optimal pain control at patient's stated goal  Outcome: Progressing Towards Goal  Goal: *Anxiety reduced or absent  Outcome: Progressing Towards Goal  Goal: *Demonstrates progressive activity  Outcome: Progressing Towards Goal     Problem: Heart Failure: Day 3  Goal: Off Pathway (Use only if patient is Off Pathway)  Outcome: Progressing Towards Goal  Goal: Activity/Safety  Outcome: Progressing Towards Goal  Goal: Diagnostic Test/Procedures  Outcome: Progressing Towards Goal  Goal: Nutrition/Diet  Outcome: Progressing Towards Goal  Goal: Discharge Planning  Outcome: Progressing Towards Goal  Goal: Medications  Outcome: Progressing Towards Goal  Goal: Respiratory  Outcome: Progressing Towards Goal  Goal: Treatments/Interventions/Procedures  Outcome: Progressing Towards Goal  Goal: Psychosocial  Outcome: Progressing Towards Goal  Goal: *Oxygen saturation within defined limits  Outcome: Progressing Towards Goal  Goal: *Hemodynamically stable  Outcome: Progressing Towards Goal  Goal: *Optimal pain control at patient's stated goal  Outcome: Progressing Towards Goal  Goal: *Anxiety reduced or absent  Outcome: Progressing Towards Goal  Goal: *Demonstrates progressive activity  Outcome: Progressing Towards Goal     Problem: Heart Failure: Day 4  Goal: Off Pathway (Use only if patient is Off Pathway)  Outcome: Progressing Towards Goal  Goal: Activity/Safety  Outcome: Progressing Towards Goal  Goal: Diagnostic Test/Procedures  Outcome: Progressing Towards Goal  Goal: Nutrition/Diet  Outcome: Progressing Towards Goal  Goal: Discharge Planning  Outcome: Progressing Towards Goal  Goal: Medications  Outcome: Progressing Towards Goal  Goal: Respiratory  Outcome: Progressing Towards Goal  Goal: Treatments/Interventions/Procedures  Outcome: Progressing Towards Goal  Goal: Psychosocial  Outcome: Progressing Towards Goal  Goal: *Oxygen saturation within defined limits  Outcome: Progressing Towards Goal  Goal: *Hemodynamically stable  Outcome: Progressing Towards Goal  Goal: *Optimal pain control at patient's stated goal  Outcome: Progressing Towards Goal  Goal: *Anxiety reduced or absent  Outcome: Progressing Towards Goal  Goal: *Demonstrates progressive activity  Outcome: Progressing Towards Goal     Problem: Heart Failure: Day 5  Goal: Off Pathway (Use only if patient is Off Pathway)  Outcome: Progressing Towards Goal  Goal: Activity/Safety  Outcome: Progressing Towards Goal  Goal: Diagnostic Test/Procedures  Outcome: Progressing Towards Goal  Goal: Nutrition/Diet  Outcome: Progressing Towards Goal  Goal: Discharge Planning  Outcome: Progressing Towards Goal  Goal: Medications  Outcome: Progressing Towards Goal  Goal: Respiratory  Outcome: Progressing Towards Goal  Goal: Treatments/Interventions/Procedures  Outcome: Progressing Towards Goal  Goal: Psychosocial  Outcome: Progressing Towards Goal     Problem: Heart Failure: Discharge Outcomes  Goal: *Demonstrates ability to perform prescribed activity without shortness of breath or discomfort  Outcome: Progressing Towards Goal  Goal: *Left ventricular function assessment completed prior to or during stay, or planned for post-discharge  Outcome: Progressing Towards Goal  Goal: *ACEI prescribed if LVEF less than 40% and no contraindications or ARB prescribed  Outcome: Progressing Towards Goal  Goal: *Verbalizes understanding and describes prescribed diet  Outcome: Progressing Towards Goal  Goal: *Verbalizes understanding/describes prescribed medications  Outcome: Progressing Towards Goal  Goal: *Describes available resources and support systems  Description  (eg: Home Health, Palliative Care, Advanced Medical Directive)  Outcome: Progressing Towards Goal  Goal: *Describes smoking cessation resources  Outcome: Progressing Towards Goal  Goal: *Understands and describes signs and symptoms to report to providers(Stroke Metric)  Outcome: Progressing Towards Goal  Goal: *Describes/verbalizes understanding of follow-up/return appt  Description  (eg: to physicians, diabetes treatment coordinator, and other resources  Outcome: Progressing Towards Goal  Goal: *Describes importance of continuing daily weights and changes to report to physician  Outcome: Progressing Towards Goal     Problem: Patient Education: Go to Patient Education Activity  Goal: Patient/Family Education  Outcome: Progressing Towards Goal     Problem: Afib Pathway: Day 1  Goal: Off Pathway (Use only if patient is Off Pathway)  Outcome: Progressing Towards Goal  Goal: Activity/Safety  Outcome: Progressing Towards Goal  Goal: Consults, if ordered  Outcome: Progressing Towards Goal  Goal: Diagnostic Test/Procedures  Outcome: Progressing Towards Goal  Goal: Nutrition/Diet  Outcome: Progressing Towards Goal  Goal: Discharge Planning  Outcome: Progressing Towards Goal  Goal: Medications  Outcome: Progressing Towards Goal  Goal: Respiratory  Outcome: Progressing Towards Goal  Goal: Treatments/Interventions/Procedures  Outcome: Progressing Towards Goal  Goal: Psychosocial  Outcome: Progressing Towards Goal  Goal: *Optimal pain control at patient's stated goal  Outcome: Progressing Towards Goal  Goal: *Hemodynamically stable  Outcome: Progressing Towards Goal  Goal: *Stable cardiac rhythm  Outcome: Progressing Towards Goal  Goal: *Lungs clear or at baseline  Outcome: Progressing Towards Goal  Goal: *Labs within defined limits  Outcome: Progressing Towards Goal  Goal: *Describes available resources and support systems  Outcome: Progressing Towards Goal     Problem: Afib Pathway: Day 2  Goal: Off Pathway (Use only if patient is Off Pathway)  Outcome: Progressing Towards Goal  Goal: Activity/Safety  Outcome: Progressing Towards Goal  Goal: Consults, if ordered  Outcome: Progressing Towards Goal  Goal: Diagnostic Test/Procedures  Outcome: Progressing Towards Goal  Goal: Nutrition/Diet  Outcome: Progressing Towards Goal  Goal: Discharge Planning  Outcome: Progressing Towards Goal  Goal: Medications  Outcome: Progressing Towards Goal  Goal: Respiratory  Outcome: Progressing Towards Goal  Goal: Treatments/Interventions/Procedures  Outcome: Progressing Towards Goal  Goal: Psychosocial  Outcome: Progressing Towards Goal  Goal: *Hemodynamically stable  Outcome: Progressing Towards Goal  Goal: *Optimal pain control at patient's stated goal  Outcome: Progressing Towards Goal  Goal: *Stable cardiac rhythm  Outcome: Progressing Towards Goal  Goal: *Lungs clear or at baseline  Outcome: Progressing Towards Goal  Goal: *Describes available resources and support systems  Outcome: Progressing Towards Goal     Problem: Afib Pathway: Day 3  Goal: Off Pathway (Use only if patient is Off Pathway)  Outcome: Progressing Towards Goal  Goal: Activity/Safety  Outcome: Progressing Towards Goal  Goal: Diagnostic Test/Procedures  Outcome: Progressing Towards Goal  Goal: Nutrition/Diet  Outcome: Progressing Towards Goal  Goal: Discharge Planning  Outcome: Progressing Towards Goal  Goal: Medications  Outcome: Progressing Towards Goal  Goal: Respiratory  Outcome: Progressing Towards Goal  Goal: Treatments/Interventions/Procedures  Outcome: Progressing Towards Goal  Goal: Psychosocial  Outcome: Progressing Towards Goal     Problem: Afib: Discharge Outcomes (not in CAT)  Goal: *Hemodynamically stable  Outcome: Progressing Towards Goal  Goal: *Stable cardiac rhythm  Outcome: Progressing Towards Goal  Goal: *Lungs clear or at baseline  Outcome: Progressing Towards Goal  Goal: *Optimal pain control at patient's stated goal  Outcome: Progressing Towards Goal  Goal: *Identifies cardiac risk factors  Outcome: Progressing Towards Goal  Goal: *Verbalizes home exercise program, activity guidelines, cardiac precautions  Outcome: Progressing Towards Goal  Goal: *Verbalizes understanding and describes prescribed diet  Outcome: Progressing Towards Goal  Goal: *Verbalizes understanding and describes medication purposes and frequencies  Outcome: Progressing Towards Goal  Goal: *Anxiety reduced or absent  Outcome: Progressing Towards Goal  Goal: *Understands and describes signs and symptoms to report to providers(Stroke Metric)  Outcome: Progressing Towards Goal  Goal: *Describes follow-up/return visits to physicians  Outcome: Progressing Towards Goal  Goal: *Describes available resources and support systems  Outcome: Progressing Towards Goal  Goal: *Influenza immunization  Outcome: Progressing Towards Goal  Goal: *Pneumococcal immunization  Outcome: Progressing Towards Goal  Goal: *Describes smoking cessation resources  Outcome: Progressing Towards Goal

## 2019-12-23 NOTE — PROGRESS NOTES
Problem: Heart Failure: Discharge Outcomes  Goal: *Demonstrates ability to perform prescribed activity without shortness of breath or discomfort  Outcome: Progressing Towards Goal  Goal: *Left ventricular function assessment completed prior to or during stay, or planned for post-discharge  Outcome: Progressing Towards Goal  Goal: *ACEI prescribed if LVEF less than 40% and no contraindications or ARB prescribed  Outcome: Progressing Towards Goal  Goal: *Verbalizes understanding and describes prescribed diet  Outcome: Progressing Towards Goal  Goal: *Verbalizes understanding/describes prescribed medications  Outcome: Progressing Towards Goal  Goal: *Describes available resources and support systems  Description  (eg: Home Health, Palliative Care, Advanced Medical Directive)  Outcome: Progressing Towards Goal  Goal: *Describes smoking cessation resources  Outcome: Progressing Towards Goal  Goal: *Understands and describes signs and symptoms to report to providers(Stroke Metric)  Outcome: Progressing Towards Goal  Goal: *Describes/verbalizes understanding of follow-up/return appt  Description  (eg: to physicians, diabetes treatment coordinator, and other resources  Outcome: Progressing Towards Goal  Goal: *Describes importance of continuing daily weights and changes to report to physician  Outcome: Progressing Towards Goal

## 2019-12-23 NOTE — PROGRESS NOTES
Bedside shift change report given to Danielle RN (oncoming nurse) by Leobardo Irwin RN (offgoing nurse). Report included the following information SBAR, Kardex, Intake/Output, MAR and Recent Results. A/O x4, no pain or SOB, Afib uncontrolled, IV flushed and infusing, call bell and personal belongings in reach.      0816 -- AM medications given, well tolerated, will continue to monitor. 1000 -- Bumex drip stopped.      1116 -- Reassessment completed, no change in patient condition, will continue to monitor.      1506 -- Reassessment completed, no change in patient condition, will continue to monitor.      Bedside shift change report given to [facility], RN (oncoming nurse) by Kelley Fraser RN (offgoing nurse). Report included the following information SBAR, Kardex, Intake/Output, MAR and Recent Results.

## 2019-12-23 NOTE — CONSULTS
[facility] - Consult Note    Consultation request by Linda Coyle PA-C for advice/opinion related to evaluating CHF    Date of  Admission: 12/22/2019  2:30 PM   Primary Care Physician:  Meggan Brady MD     The patient was seen, examined, and independently evaluated and I agree with the below assessment and plan by Amber Munson PA-C with the following comments. This lady's echocardiogram suggests some LV dysfunction and a moderate pericardial effusion, but no indirect echo evidence of tamponade. Her heart rate in atrial fibrillation is still in the 140-150 range on Lopressor 50 mg orally every 12 hours. In view of LV dysfunction and persistent high heart rate would digitalize. Currently would simply recommend continued Bumex 1 mg IV twice daily changed from drip earlier today with further recommendations pending her course. Assessment:     -CHF, new diagnosis. BNP 7526 with mild central vascular congestion on presentation 12/22/19.  -Atrial fibrillation, seen as far back as 09/2018 on EKG. States has not been on anticoagulation in the past.  [id number] score of 4 (+ HTN, + female, +2 Hx TIA)  -Hx TIA 4+ years ago  -HTN, on Lisinopril  -HLD  -Hx hypothyroidism, on Synthroid as outpatient     Plan:     -Continue IV diuresis with close monitoring of renal indices and strict I/O's. She has diuresed > 7L thus far with Bumex infusion, which was d/c this AM.  Will start scheduled IV Bumex this evening.  -Echocardiogram completed this morning, pending results.  -Sodium and fluid restriction were discussed with patient, she expresses understanding.  -Will increase Lopressor to 50 mg BID and add PRN IV Metoprolol for HR > 130.  -Continued on Lisinopril.  -Pt started on Eliquis for anticoagulation by primary team due to atrial fibrillation. History of Present Illness: This is a 61 y.o. female admitted for CHF (congestive heart failure) ([facility] [address] [I50.9]  HTN (hypertension) [I10]. Patient complains of:  Shortness of breath, abdominal bloating    Courtney Knutson is a 61 y.o. female with PMHx as described above, who presented to the hospital due to abdominal bloating and ankle/leg swelling x 3+ weeks. Pt states that she was taking medicines for gas without relief. She has also been experiencing shortness over breath intermittently over the past 2+ weeks with worsening shortness of breath with exertion over the past week. She states that she has chronic problems with sleep but reports that she has noticed some shortness of breath when lying in certain positions and abdominal discomfort at nighttime, for which she has to sit up to be more comfortable. Cardiac risk factors: dyslipidemia, hypertension      Review of Symptoms:  Except as stated above include:  Constitutional:  negative  Respiratory:  negative  Cardiovascular:  As per HPI  Gastrointestinal: negative  Genitourinary:  negative  Musculoskeletal:  Negative  Neurological:  Negative  Dermatological:  Negative  Endocrinological: Negative  Psychological:  Negative       Past Medical History:     Past Medical History:   Diagnosis Date    [facility]    CAD (coronary artery disease)     A-fib    Hypertension     Hypothyroid          Social History:     Social History     Socioeconomic History    Marital status:      Spouse name: Not on file    Number of children: Not on file    Years of education: Not on file    Highest education level: Not on file   Tobacco Use    Smoking status: Never Smoker    Smokeless tobacco: Never Used   Substance and Sexual Activity    Alcohol use: Yes    Drug use: No        Family History:   No family history on file. Medications:      Allergies   Allergen Reactions    Ampicillin Rash    Penicillins Unknown (comments)     Syncope    Seafood Rash    Shellfish Derived Hives        Current Facility-Administered Medications   Medication Dose Route Frequency    potassium chloride (K-DUR, KLOR-CON) SR tablet 40 mEq  40 mEq Oral Q4H    bumetanide (BUMEX) injection 1 mg  1 mg IntraVENous ONCE    aspirin chewable tablet 81 mg  81 mg Oral DAILY    atorvastatin (LIPITOR) tablet 40 mg  40 mg Oral QHS    levothyroxine (SYNTHROID) tablet 100 mcg  100 mcg Oral 6am    lisinopril (PRINIVIL, ZESTRIL) tablet 20 mg  20 mg Oral DAILY    metoprolol tartrate (LOPRESSOR) tablet 25 mg  25 mg Oral Q12H    apixaban (ELIQUIS) tablet 5 mg  5 mg Oral BID         Physical Exam:     Visit Vitals  BP (!) 142/97   Pulse (!) 118   Temp 97 °F (36.1 °C)   Resp 29   Ht 5' 5\" (1.651 m)   Wt 266 lb (120.7 kg)   SpO2 94%   BMI 44.26 kg/m²     BP Readings from Last 3 Encounters:   12/23/19 (!) 142/97   12/17/19 (!) 145/115   10/15/19 (!) 156/122     Pulse Readings from Last 3 Encounters:   12/23/19 (!) 118   12/17/19 (!) 110   10/15/19 89     Wt Readings from Last 3 Encounters:   12/23/19 266 lb (120.7 kg)   12/17/19 235 lb (106.6 kg)   10/15/19 233 lb (105.7 kg)       General:  alert, cooperative, no distress, appears stated age  Neck:  supple  Lungs:  clear to auscultation bilaterally  Heart:  Irregularly irregular rhythm  Abdomen:  abdomen is soft without significant tenderness, masses, organomegaly or guarding  Extremities:  atraumatic with 1 + edema  Skin: Warm and dry.    Neuro: alert, oriented x3, affect appropriate, no focal neurological deficits, moves all extremities well, no involuntary movements  Psych: non focal     Data Review:     Recent Labs     12/23/19  0530 12/22/19  1546   WBC 9.8 9.4   HGB 14.4 13.7   HCT 45.8* 42.6    178     Recent Labs     12/23/19  0530 12/22/19  1546    142   K 3.0* 3.3*    106   CO2 33* 30   GLU 88 98   BUN 29* 32*   CREA 1.12 1.26   CA 10.0 9.0   MG 1.2*  --    ALB  --  3.5   SGOT  --  52*   ALT  --  67*       Results for orders placed or performed during the hospital encounter of 12/22/19   EKG, 12 LEAD, INITIAL   Result Value Ref Range Ventricular Rate 105 BPM    QRS Duration 68 ms    Q-T Interval 350 ms    QTC Calculation (Bezet) 462 ms    Calculated R Axis 74 degrees    Calculated T Axis -109 degrees    Diagnosis       Atrial fibrillation with rapid ventricular response  Nonspecific ST and T wave abnormality  Abnormal ECG  When compared with ECG of 17-DEC-2019 17:29,  Nonspecific T wave abnormality less evident in anterior leads.   Confirmed by Yolanda Mendez ([id number]) on 12/23/2019 9:48:54 AM         All Cardiac Markers in the last 24 hours:    Lab Results   Component Value Date/Time     (H) 12/23/2019 05:30 AM     (H) 12/22/2019 09:03 PM    CKMB 4.5 (H) 12/23/2019 05:30 AM    CKMB 4.4 (H) 12/22/2019 09:03 PM    CKND1 1.7 12/23/2019 05:30 AM    CKND1 2.0 12/22/2019 09:03 PM    TROIQ 0.38 (H) 12/23/2019 05:30 AM    TROIQ 0.35 (H) 12/22/2019 09:03 PM    TROIQ 0.36 (H) 12/22/2019 03:46 PM       Last Lipid:    Lab Results   Component Value Date/Time    Cholesterol, total 204 (H) 09/05/2018 04:30 AM    HDL Cholesterol 54 09/05/2018 04:30 AM    LDL, calculated 131.8 (H) 09/05/2018 04:30 AM    Triglyceride 91 09/05/2018 04:30 AM    CHOL/HDL Ratio 3.8 09/05/2018 04:30 AM       Signed By: Michele Savage PA-C     December 23, 2019

## 2019-12-23 NOTE — PROGRESS NOTES
Discharge/Transition Planning    Problem: Discharge Planning  Goal: *Discharge to safe environment  Outcome: Progressing Towards Goal           Reason for Admission:   Acute on chronic diastolic congestive heart failure                    RRAT Score:    9                 Plan for utilizing home health:      Not likely unless [facility]                    Current Advanced Directive/Advance Care Plan: none                         Transition of Care Plan:       Interviewed patient. Verified demographics listed on face sheet with patient; all information correct. Pt with [employer]. Patient stated their PCP is Dr Yesika Ramirez and has appt Jan 3rd and goes every 6 mo. Patient shares home with mother. Patient's NOK is mom. Patient independent with ADLs prior to admission. No use of DME prior to admission. Discharge plan is           Home      Patient has designated _____mom___________________ to participate in his/her discharge plan and to receive any needed information. Laurie Ibrahim Mother [phone]       Care Management Interventions  PCP Verified by CM: Yes(Dr Yesika Ramirez)  Last Visit to PCP: 06/12/19  Mode of Transport at Discharge:  Other (see comment)(family)  Transition of Care Consult (CM Consult): Discharge Planning  Current Support Network: Own Home(with Mom)  Confirm Follow Up Transport: Self  The Patient and/or Patient Representative was Provided with a Choice of Provider and Agrees with the Discharge Plan?: Yes  Discharge Location  Discharge Placement: Home

## 2019-12-23 NOTE — CDMP QUERY
Patient admitted with Acute Diastolic Chf, noted to have Afib with RVR, per cardiology pt's Afib goes as far back as 2018,   If possible, please document in progress notes and d/c summary if you are evaluating and/or treating any of the following: 
 
=> Paroxysmal Atrial Fibrillation 
=> Persistent Atrial Fibrillation 
=> Permanent Atrial Fibrillation  
=> Other Explanation of clinical findings 
=> Clinically Undetermined (no explanation for clinical findings) The medical record reflects the following: 
   Risk Factors:  Htn, chf, Clinical Indicators:  afib on ekg Treatment: lopressor, dig, Thank you, 
Carmen Turcios RN/[facility] 
[phone] Paroxysmal:  begins suddenly and stops on its own. Symptoms can be mild or severe. They stop within about a week, but usually in less than 24 hours. Persistent: continues for more than a week. It may stop on its own, or it can be stopped with treatment. Permanent: cannot be restored with treatment

## 2019-12-24 LAB
ANION GAP SERPL CALC-SCNC: 7 MMOL/L (ref 3–18)
BUN SERPL-MCNC: 28 MG/DL (ref 7–18)
BUN/CREAT SERPL: 20 (ref 12–20)
CALCIUM SERPL-MCNC: 9.4 MG/DL (ref 8.5–10.1)
CHLORIDE SERPL-SCNC: 100 MMOL/L (ref 100–111)
CK MB CFR SERPL CALC: 0.9 % (ref 0–4)
CK MB CFR SERPL CALC: 1.1 % (ref 0–4)
CK MB SERPL-MCNC: 1.7 NG/ML (ref 5–25)
CK MB SERPL-MCNC: 2 NG/ML (ref 5–25)
CK SERPL-CCNC: 181 U/L (ref 26–192)
CK SERPL-CCNC: 185 U/L (ref 26–192)
CO2 SERPL-SCNC: 37 MMOL/L (ref 21–32)
CREAT SERPL-MCNC: 1.37 MG/DL (ref 0.6–1.3)
GLUCOSE SERPL-MCNC: 78 MG/DL (ref 74–99)
MAGNESIUM SERPL-MCNC: 1.2 MG/DL (ref 1.6–2.6)
POTASSIUM SERPL-SCNC: 3.3 MMOL/L (ref 3.5–5.5)
SODIUM SERPL-SCNC: 144 MMOL/L (ref 136–145)
TROPONIN I SERPL-MCNC: 0.35 NG/ML (ref 0–0.04)
TROPONIN I SERPL-MCNC: 0.36 NG/ML (ref 0–0.04)

## 2019-12-24 PROCEDURE — 36415 COLL VENOUS BLD VENIPUNCTURE: CPT

## 2019-12-24 PROCEDURE — 74011250637 HC RX REV CODE- 250/637: Performed by: INTERNAL MEDICINE

## 2019-12-24 PROCEDURE — 65660000000 HC RM CCU STEPDOWN

## 2019-12-24 PROCEDURE — 74011250637 HC RX REV CODE- 250/637: Performed by: HOSPITALIST

## 2019-12-24 PROCEDURE — 74011250637 HC RX REV CODE- 250/637: Performed by: PHYSICIAN ASSISTANT

## 2019-12-24 PROCEDURE — 74011250636 HC RX REV CODE- 250/636: Performed by: HOSPITALIST

## 2019-12-24 PROCEDURE — 83735 ASSAY OF MAGNESIUM: CPT

## 2019-12-24 PROCEDURE — 82550 ASSAY OF CK (CPK): CPT

## 2019-12-24 PROCEDURE — 74011000250 HC RX REV CODE- 250: Performed by: PHYSICIAN ASSISTANT

## 2019-12-24 PROCEDURE — 80048 BASIC METABOLIC PNL TOTAL CA: CPT

## 2019-12-24 RX ORDER — MAGNESIUM SULFATE HEPTAHYDRATE 40 MG/ML
2 INJECTION, SOLUTION INTRAVENOUS ONCE
Status: COMPLETED | OUTPATIENT
Start: 2019-12-24 | End: 2019-12-24

## 2019-12-24 RX ORDER — POTASSIUM CHLORIDE 20 MEQ/1
40 TABLET, EXTENDED RELEASE ORAL ONCE
Status: COMPLETED | OUTPATIENT
Start: 2019-12-24 | End: 2019-12-24

## 2019-12-24 RX ORDER — AMLODIPINE BESYLATE 10 MG/1
10 TABLET ORAL DAILY
Status: DISCONTINUED | OUTPATIENT
Start: 2019-12-24 | End: 2019-12-25 | Stop reason: HOSPADM

## 2019-12-24 RX ORDER — SPIRONOLACTONE 25 MG/1
25 TABLET ORAL DAILY
Status: DISCONTINUED | OUTPATIENT
Start: 2019-12-24 | End: 2019-12-25 | Stop reason: HOSPADM

## 2019-12-24 RX ORDER — AMLODIPINE BESYLATE 5 MG/1
5 TABLET ORAL DAILY
Status: DISCONTINUED | OUTPATIENT
Start: 2019-12-24 | End: 2019-12-24

## 2019-12-24 RX ORDER — BUMETANIDE 1 MG/1
1 TABLET ORAL DAILY
Status: DISCONTINUED | OUTPATIENT
Start: 2019-12-25 | End: 2019-12-25 | Stop reason: HOSPADM

## 2019-12-24 RX ORDER — POTASSIUM CHLORIDE 20 MEQ/1
20 TABLET, EXTENDED RELEASE ORAL DAILY
Status: DISCONTINUED | OUTPATIENT
Start: 2019-12-24 | End: 2019-12-25 | Stop reason: HOSPADM

## 2019-12-24 RX ORDER — LISINOPRIL 40 MG/1
40 TABLET ORAL DAILY
Status: DISCONTINUED | OUTPATIENT
Start: 2019-12-25 | End: 2019-12-25 | Stop reason: HOSPADM

## 2019-12-24 RX ADMIN — METOPROLOL TARTRATE 50 MG: 50 TABLET, FILM COATED ORAL at 08:14

## 2019-12-24 RX ADMIN — POTASSIUM CHLORIDE 20 MEQ: 1500 TABLET, EXTENDED RELEASE ORAL at 15:01

## 2019-12-24 RX ADMIN — POTASSIUM CHLORIDE 40 MEQ: 1500 TABLET, EXTENDED RELEASE ORAL at 08:16

## 2019-12-24 RX ADMIN — METOPROLOL TARTRATE 50 MG: 50 TABLET, FILM COATED ORAL at 20:44

## 2019-12-24 RX ADMIN — LISINOPRIL 20 MG: 20 TABLET ORAL at 08:13

## 2019-12-24 RX ADMIN — BUMETANIDE 1 MG: 0.25 INJECTION INTRAMUSCULAR; INTRAVENOUS at 08:12

## 2019-12-24 RX ADMIN — APIXABAN 5 MG: 5 TABLET, FILM COATED ORAL at 17:59

## 2019-12-24 RX ADMIN — ATORVASTATIN CALCIUM 40 MG: 40 TABLET, FILM COATED ORAL at 20:44

## 2019-12-24 RX ADMIN — LEVOTHYROXINE SODIUM 100 MCG: 75 TABLET ORAL at 08:17

## 2019-12-24 RX ADMIN — DIGOXIN 0.12 MG: 125 TABLET ORAL at 08:14

## 2019-12-24 RX ADMIN — ASPIRIN 81 MG 81 MG: 81 TABLET ORAL at 08:13

## 2019-12-24 RX ADMIN — AMLODIPINE BESYLATE 10 MG: 10 TABLET ORAL at 15:01

## 2019-12-24 RX ADMIN — MAGNESIUM SULFATE HEPTAHYDRATE 2 G: 40 INJECTION, SOLUTION INTRAVENOUS at 08:12

## 2019-12-24 RX ADMIN — LIDOCAINE HYDROCHLORIDE 40 ML: 20 SOLUTION ORAL; TOPICAL at 20:44

## 2019-12-24 RX ADMIN — SPIRONOLACTONE 25 MG: 25 TABLET ORAL at 15:01

## 2019-12-24 RX ADMIN — APIXABAN 5 MG: 5 TABLET, FILM COATED ORAL at 08:14

## 2019-12-24 NOTE — PROGRESS NOTES
Internal Medicine Progress Note    Patient's Name: Terry Haddad  Admit Date: 12/22/2019  Length of Stay: 2      Assessment/Plan     Principal Problem:    Acute on chronic diastolic congestive heart failure ([facility] [address] (12/22/2019)    Active Problems:    A-fib ([facility] [address] (9/4/2018)      HTN (hypertension) (9/5/2018)      Obese (12/22/2019)      ADHF/HTN  -net negative since admission, continue with diuresis   -Strict I/O  -Oxygen as needed, wean as tolerated if needed at all   -BB, ACEi, increased dose due to hypertension, Norvasc added  -ECHO completd  -Cardiac diet  -Cardiology consulted   -denies hx of HF. ECHO last year with mild reduced systolic function and mild dilated RV. Suspect worsened with worsened mostly diastolic dysfunction   -Trended troponin, suspect demand related, no signs of ACS. Remained flat     AFIB RVR  -Recently diagnosed, has been taking metoprolol, will resume  -will add Eliquis, discussed with patient, continue ASA  -Chadvasc 4  -Rates improved, cardiology consulted   -dig added, dig level tomorrow, continue    - Cont acceptable home medications for chronic conditions   - DVT protocol    I have personally reviewed all pertinent labs and films that have officially resulted over the last 24 hours. I have personally checked for all pending labs that are awaiting final results. Interval History   \"Courtney Espinoza is a 61 y.o. female with a PMHx HTN, hypothyroid, CAD, and of recent dx of afib who presented to the ED with complaints of worsening dyspnea for the past few days. She was seen at urgent care and sent to the ED for HTN which was initially in the 190s/160s and afib in the 130s. She was seen in the ED dec 17th for ADHF, elevated troponin, rapid afib, /110 and declined admission. She denies a hx of HF but reports new orthopnea, RODGERS, PND, pedal edema, and fatigue.   She has a cardiologist referal for new afib dx but has yet to see him.          In the ED she is hypervolemic, afib RVR, elevated BNP worse from dec 17th visit. Troponin is better though. Will be admitted for further eval \"    ADHF treated with BB, ACEi, and bumex gtt. Net negative since admission with improvement of symptoms. Cardiology consulted - she was started on eliquis for recent diagnoses of Afib with RVR. Risks/benefits discussed with patient. Subjective     Pt s/e @ bedside. No major events overnight. No complaints today feels better. Rates improved with dig, BP remains elevated, gut distention is better     Objective     Visit Vitals  BP (!) 166/135 (BP 1 Location: Right arm, BP Patient Position: At rest)   Pulse (!) 104   Temp 97.9 °F (36.6 °C)   Resp 18   Ht 5' 5\" (1.651 m)   Wt 112.2 kg (247 lb 5.7 oz)   SpO2 97%   BMI 41.16 kg/m²       Physical Exam:  General Appearance: NAD, conversant, obese  HENT: normocephalic/atraumatic, moist mucus membranes  Neck: No JVD, supple  Lungs: CTA with normal respiratory effort  CV: IRIR, no m/r/g  Abdomen: soft, non-tender, normal bowel sounds, non distended  Extremities: no cyanosis, LLE 1+ peripheral edema, RLE 2+ edema  Neuro: No focal deficits, motor/sensory intact    Intake and Output:  Current Shift:  12/24 0701 - 12/24 1900  In: 360 [P.O.:360]  Out: 400 [Urine:400]  Last three shifts:  12/22 1901 - 12/24 0700  In: 1162.4 [P.O.:1080; I.V.:82.4]  Out: 68885 [Urine:16866]    Lab/Data Reviewed: All lab results for the last 24 hours reviewed. Imaging Reviewed:  No results found.     Medications Reviewed:  Current Facility-Administered Medications   Medication Dose Route Frequency    [START ON 12/25/2019] lisinopril (PRINIVIL, ZESTRIL) tablet 40 mg  40 mg Oral DAILY    [START ON 12/25/2019] bumetanide (BUMEX) tablet 1 mg  1 mg Oral DAILY    spironolactone (ALDACTONE) tablet 25 mg  25 mg Oral DAILY    potassium chloride (K-DUR, KLOR-CON) SR tablet 20 mEq  20 mEq Oral DAILY    amLODIPine (NORVASC) tablet 10 mg  10 mg Oral DAILY    metoprolol tartrate (LOPRESSOR) tablet 50 mg  50 mg Oral Q12H    metoprolol (LOPRESSOR) injection 2.5 mg  2.5 mg IntraVENous Q4H PRN    digoxin (LANOXIN) tablet 0.125 mg  0.125 mg Oral DAILY    aspirin chewable tablet 81 mg  81 mg Oral DAILY    atorvastatin (LIPITOR) tablet 40 mg  40 mg Oral QHS    levothyroxine (SYNTHROID) tablet 100 mcg  100 mcg Oral 6am    apixaban (ELIQUIS) tablet 5 mg  5 mg Oral BID         [facility]  Hospitalist Division  Pager: [phone]  Office: [phone]

## 2019-12-24 NOTE — PROGRESS NOTES
[facility]  -  Progress Note      Patient: Peggy Sher MRN: [id number]  SSN: [id number]    YOB: 1960  Age: 61 y.o. Sex: female      Admit Date: 12/22/2019    Assessment:     -CHF, new diagnosis. BNP 7526 with mild central vascular congestion on presentation 12/22/19.  -Atrial fibrillation, seen as far back as 09/2018 on EKG. States has not been on anticoagulation in the past.  [id number] score of 4 (+ HTN, + female, +2 Hx TIA)  -Hx TIA 4+ years ago  -HTN, on Lisinopril  -HLD  -Hx hypothyroidism, on Synthroid as outpatient  -Morbid obesity    Plan:     1. Add Norvasc to regimen. Agree with increase ACE. Once blood pressure showing some control would be OK to discharge from [facility]. 2. Continue digoxin and check digoxin level in the AM if the patient hasn't been discharged  3. Recheck NT pro-BNP in AM if still in hospital  4. Would change IV Bumex to oral in the AM. Probably Bumex 1 mg daily to start. 5. Would replace KCL today and start daily Aldactone in view of LV dysfunction. 6. Once discharged would recommend daily weights at home and calling our office if weight increases or decreases by 3 pounds as compared to baseline weight which should be the day after discharge in the morning on her bathroom scale. Subjective:     No new complaints. Heart rate better controlled, but blood pressure remains poorly controlled. Troponin's mildly elevated, but flat and not consistent with ACS. Output remains good with negative 7260 in past 24 hours.      Objective:      Patient Vitals for the past 8 hrs:   Temp Pulse Resp BP SpO2   12/24/19 1215 97.8 °F (36.6 °C) 96 18 (!) 170/107 97 %   12/24/19 0812 97.8 °F (36.6 °C) 95 18 (!) 167/139 94 %         Patient Vitals for the past 96 hrs:   Weight   12/24/19 0424 112.2 kg (247 lb 5.7 oz)   12/23/19 0945 120.7 kg (266 lb)   12/23/19 0759 114 kg (251 lb 4.8 oz)   12/23/19 0505 115.7 kg (255 lb)   12/22/19 1903 121 kg (266 lb 11.2 oz)   12/22/19 1424 106.6 kg (235 lb)         Intake/Output Summary (Last 24 hours) at 12/24/2019 1314  Last data filed at 12/24/2019 1100  Gross per 24 hour   Intake 960 ml   Output 5900 ml   Net -4940 ml       Physical Exam:  General:  alert, cooperative, no distress, appears stated age  Neck:  Mild JVD  Lungs:  clear to auscultation bilaterally  Heart:  Irregularly irregular rate and rhythm, S1, S2 normal, no murmur, click, rub or gallop  Abdomen:  abdomen is soft without significant tenderness, masses, organomegaly or guarding  Extremities:  extremities normal, atraumatic, no cyanosis or edema    Data Review:     Labs: Results:       Chemistry Recent Labs     12/24/19  0200 12/23/19  0530 12/22/19  1546   GLU 78 88 98    144 142   K 3.3* 3.0* 3.3*    103 106   CO2 37* 33* 30   BUN 28* 29* 32*   CREA 1.37* 1.12 1.26   CA 9.4 10.0 9.0   MG 1.2* 1.2*  --    AGAP 7 8 6   BUCR 20 26* 25*   AP  --   --  180*   TP  --   --  6.3*   ALB  --   --  3.5   GLOB  --   --  2.8   AGRAT  --   --  1.3      CBC w/Diff Recent Labs     12/23/19  0530 12/22/19  1546   WBC 9.8 9.4   RBC 4.66 4.34   HGB 14.4 13.7   HCT 45.8* 42.6    178   GRANS 73 77*   LYMPH 17* 13*   EOS 1 0      Cardiac Enzymes Lab Results   Component Value Date/Time     12/24/2019 02:00 AM     12/23/2019 08:00 PM     (H) 12/23/2019 02:30 PM    CKMB 1.7 12/24/2019 02:00 AM    CKMB 2.0 12/23/2019 08:00 PM    CKMB 2.5 12/23/2019 02:30 PM    CKND1 0.9 12/24/2019 02:00 AM    CKND1 1.1 12/23/2019 08:00 PM    CKND1 1.2 12/23/2019 02:30 PM    TROIQ 0.36 (H) 12/24/2019 02:00 AM    TROIQ 0.35 (H) 12/23/2019 08:00 PM    TROIQ 0.37 (H) 12/23/2019 02:30 PM      Coagulation No results for input(s): PTP, INR, APTT, INREXT, INREXT in the last 72 hours.     Lipid Panel Lab Results   Component Value Date/Time    Cholesterol, total 204 (H) 09/05/2018 04:30 AM    HDL Cholesterol 54 09/05/2018 04:30 AM    LDL, calculated 131.8 (H) 09/05/2018 04:30 AM    VLDL, calculated 18.2 09/05/2018 04:30 AM    Triglyceride 91 09/05/2018 04:30 AM    CHOL/HDL Ratio 3.8 09/05/2018 04:30 AM      BNP No results found for: BNP, BNPP, XBNPT   Liver Enzymes Recent Labs     12/22/19  1546   TP 6.3*   ALB 3.5   *   SGOT 52*      Digoxin    Thyroid Studies Lab Results   Component Value Date/Time    TSH 0.84 12/22/2019 03:46 PM           Chris Carey DO   December 24, 2019

## 2019-12-24 NOTE — PROGRESS NOTES
Bedside shift change report given to INDIRA Santos (oncoming nurse) by Romero Aggarwal RN (offgoing nurse). Report included the following information SBAR, Kardex, Intake/Output, MAR and Recent Results.      0813 -- AM medications given, well tolerated, will continue to monitor.      1157 -- Reassessment completed, no change in patient condition, will continue to monitor.      1509 -- Reassessment completed, no change in patient condition, will continue to monitor.      Bedside shift change report given to Romero Aggarwal RN (oncoming nurse) by Guero Browning RN (offgoing nurse). Report included the following information SBAR, Kardex, Intake/Output, MAR and Recent Results.

## 2019-12-24 NOTE — PROGRESS NOTES
1942- Mew Score a 4. HR elevated to 138.   2014- PRN Lopressor administered. 2115- HR in the 90's to low 100's. Patient rhythm AFIB. 2150- Patient with c/o upper abdominal pain; says abdomen feels tight; unsure if it is gas pain. Pt denying CP. MD paged. Awaiting orders.

## 2019-12-25 VITALS
DIASTOLIC BLOOD PRESSURE: 93 MMHG | HEART RATE: 87 BPM | OXYGEN SATURATION: 94 % | TEMPERATURE: 98 F | RESPIRATION RATE: 18 BRPM | WEIGHT: 245.45 LBS | BODY MASS INDEX: 40.89 KG/M2 | HEIGHT: 65 IN | SYSTOLIC BLOOD PRESSURE: 144 MMHG

## 2019-12-25 LAB
ANION GAP SERPL CALC-SCNC: 6 MMOL/L (ref 3–18)
BNP SERPL-MCNC: 3649 PG/ML (ref 0–900)
BUN SERPL-MCNC: 24 MG/DL (ref 7–18)
BUN/CREAT SERPL: 22 (ref 12–20)
CALCIUM SERPL-MCNC: 8.6 MG/DL (ref 8.5–10.1)
CHLORIDE SERPL-SCNC: 99 MMOL/L (ref 100–111)
CO2 SERPL-SCNC: 37 MMOL/L (ref 21–32)
CREAT SERPL-MCNC: 1.09 MG/DL (ref 0.6–1.3)
DIGOXIN SERPL-MCNC: 0.6 NG/ML (ref 0.9–2)
GLUCOSE SERPL-MCNC: 82 MG/DL (ref 74–99)
POTASSIUM SERPL-SCNC: 3.4 MMOL/L (ref 3.5–5.5)
SODIUM SERPL-SCNC: 142 MMOL/L (ref 136–145)

## 2019-12-25 PROCEDURE — 80048 BASIC METABOLIC PNL TOTAL CA: CPT

## 2019-12-25 PROCEDURE — 74011250637 HC RX REV CODE- 250/637: Performed by: HOSPITALIST

## 2019-12-25 PROCEDURE — 74011250637 HC RX REV CODE- 250/637: Performed by: INTERNAL MEDICINE

## 2019-12-25 PROCEDURE — 74011000250 HC RX REV CODE- 250: Performed by: PHYSICIAN ASSISTANT

## 2019-12-25 PROCEDURE — 80162 ASSAY OF DIGOXIN TOTAL: CPT

## 2019-12-25 PROCEDURE — 74011250637 HC RX REV CODE- 250/637: Performed by: PHYSICIAN ASSISTANT

## 2019-12-25 PROCEDURE — 83880 ASSAY OF NATRIURETIC PEPTIDE: CPT

## 2019-12-25 PROCEDURE — 36415 COLL VENOUS BLD VENIPUNCTURE: CPT

## 2019-12-25 PROCEDURE — 74011250636 HC RX REV CODE- 250/636: Performed by: INTERNAL MEDICINE

## 2019-12-25 RX ORDER — METOPROLOL TARTRATE 50 MG/1
50 TABLET ORAL EVERY 12 HOURS
Qty: 60 TAB | Refills: 0 | Status: SHIPPED | OUTPATIENT
Start: 2019-12-25

## 2019-12-25 RX ORDER — BUMETANIDE 1 MG/1
1 TABLET ORAL DAILY
Qty: 30 TAB | Refills: 0 | Status: SHIPPED | OUTPATIENT
Start: 2019-12-25

## 2019-12-25 RX ORDER — AMLODIPINE BESYLATE 10 MG/1
10 TABLET ORAL DAILY
Qty: 30 TAB | Refills: 0 | Status: SHIPPED | OUTPATIENT
Start: 2019-12-25 | End: 2020-01-21

## 2019-12-25 RX ORDER — SPIRONOLACTONE 25 MG/1
25 TABLET ORAL DAILY
Qty: 30 TAB | Refills: 0 | Status: SHIPPED | OUTPATIENT
Start: 2019-12-25

## 2019-12-25 RX ORDER — LISINOPRIL 40 MG/1
40 TABLET ORAL DAILY
Qty: 30 TAB | Refills: 0 | Status: SHIPPED | OUTPATIENT
Start: 2019-12-25

## 2019-12-25 RX ORDER — DIGOXIN 125 MCG
0.12 TABLET ORAL DAILY
Qty: 30 TAB | Refills: 0 | Status: SHIPPED | OUTPATIENT
Start: 2019-12-25

## 2019-12-25 RX ORDER — MAGNESIUM SULFATE HEPTAHYDRATE 40 MG/ML
2 INJECTION, SOLUTION INTRAVENOUS ONCE
Status: COMPLETED | OUTPATIENT
Start: 2019-12-25 | End: 2019-12-25

## 2019-12-25 RX ADMIN — ASPIRIN 81 MG 81 MG: 81 TABLET ORAL at 08:48

## 2019-12-25 RX ADMIN — AMLODIPINE BESYLATE 10 MG: 10 TABLET ORAL at 08:49

## 2019-12-25 RX ADMIN — DIGOXIN 0.12 MG: 125 TABLET ORAL at 08:48

## 2019-12-25 RX ADMIN — BUMETANIDE 1 MG: 1 TABLET ORAL at 08:48

## 2019-12-25 RX ADMIN — LISINOPRIL 40 MG: 40 TABLET ORAL at 08:48

## 2019-12-25 RX ADMIN — SPIRONOLACTONE 25 MG: 25 TABLET ORAL at 08:49

## 2019-12-25 RX ADMIN — LEVOTHYROXINE SODIUM 100 MCG: 75 TABLET ORAL at 05:31

## 2019-12-25 RX ADMIN — APIXABAN 5 MG: 5 TABLET, FILM COATED ORAL at 08:48

## 2019-12-25 RX ADMIN — METOPROLOL TARTRATE 50 MG: 50 TABLET, FILM COATED ORAL at 08:48

## 2019-12-25 RX ADMIN — METOPROLOL TARTRATE 2.5 MG: 5 INJECTION INTRAVENOUS at 01:08

## 2019-12-25 RX ADMIN — POTASSIUM CHLORIDE 20 MEQ: 1500 TABLET, EXTENDED RELEASE ORAL at 08:49

## 2019-12-25 RX ADMIN — MAGNESIUM SULFATE HEPTAHYDRATE 2 G: 40 INJECTION, SOLUTION INTRAVENOUS at 08:48

## 2019-12-25 NOTE — DISCHARGE INSTRUCTIONS
Patient armband removed and shredded    DISCHARGE SUMMARY from Nurse    PATIENT INSTRUCTIONS:    After general anesthesia or intravenous sedation, for 24 hours or while taking prescription Narcotics:  · Limit your activities  · Do not drive and operate hazardous machinery  · Do not make important personal or business decisions  · Do  not drink alcoholic beverages  · If you have not urinated within 8 hours after discharge, please contact your surgeon on call. Report the following to your surgeon:  · Excessive pain, swelling, redness or odor of or around the surgical area  · Temperature over 100.5  · Nausea and vomiting lasting longer than 4 hours or if unable to take medications  · Any signs of decreased circulation or nerve impairment to extremity: change in color, persistent  numbness, tingling, coldness or increase pain  · Any questions    What to do at Home:  Recommended activity: Activity as tolerated. If you experience any of the following symptoms Palpitations, which are sensations of a racing, uncomfortable, irregular heartbeat or a flip-flopping in your chest. Weakness. Reduced ability to exercise. Fatigue. Lightheadedness. Dizziness. Shortness of breath. Chest pain, please follow up with primary care provider/ED. *  Please give a list of your current medications to your Primary Care Provider. *  Please update this list whenever your medications are discontinued, doses are      changed, or new medications (including over-the-counter products) are added. *  Please carry medication information at all times in case of emergency situations. These are general instructions for a healthy lifestyle:    No smoking/ No tobacco products/ Avoid exposure to second hand smoke  Surgeon General's Warning:  Quitting smoking now greatly reduces serious risk to your health.     Obesity, smoking, and sedentary lifestyle greatly increases your risk for illness    A healthy diet, regular physical exercise & weight monitoring are important for maintaining a healthy lifestyle    You may be retaining fluid if you have a history of heart failure or if you experience any of the following symptoms:  Weight gain of 3 pounds or more overnight or 5 pounds in a week, increased swelling in our hands or feet or shortness of breath while lying flat in bed. Please call your doctor as soon as you notice any of these symptoms; do not wait until your next office visit. MyChart Activation    Thank you for enrolling in [address] Please follow the instructions below to securely access your online medical record. [employer] allows you to send messages to your doctor, view your test results, renew your prescriptions, schedule appointments, and more. How Do I Sign Up? 1. In your internet browser, go to [email or web address]. 2. Click on the First Time User? Click Here link in the Sign In box. You will see the New Member Sign Up page. 3. Enter your [employer] Access Code exactly as it appears below. You will not need to use this code after youve completed the sign-up process. If you do not sign up before the expiration date, you must request a new code. [employer] Access Code: [email or web address]  Expires: 1/31/2020  4:41 PM     4. Enter the last four digits of your Social Security Number (xxxx) and Date of Birth (mm/dd/yyyy) as indicated and click Submit. You will be taken to the next sign-up page. 5. Create a [employer] ID. This will be your [employer] login ID and cannot be changed, so think of one that is secure and easy to remember. 6. Create a [employer] password. You can change your password at any time. 7. Enter your Password Reset Question and Answer. This can be used at a later time if you forget your password. 8. Enter your e-mail address. You will receive e-mail notification when new information is available in [address] 9. Click Sign Up. You can now view your medical record.     Additional Information    Remember, MyChart is NOT to be used for urgent needs. For medical emergencies, dial 911. Now available from your iPhone and Android! The discharge information has been reviewed with the patient. The patient verbalized understanding. Discharge medications reviewed with the patient and appropriate educational materials and side effects teaching were provided. ___________________________________________________________________________________________________________________________________      Patient Education        Atrial Fibrillation: Care Instructions  Your Care Instructions    Atrial fibrillation is an irregular and often fast heartbeat. Treating this condition is important for several reasons. It can cause blood clots, which can travel from your heart to your brain and cause a stroke. If you have a fast heartbeat, you may feel lightheaded, dizzy, and weak. An irregular heartbeat can also increase your risk for heart failure. Atrial fibrillation is often the result of another heart condition, such as high blood pressure or coronary artery disease. Making changes to improve your heart condition will help you stay healthy and active. Follow-up care is a key part of your treatment and safety. Be sure to make and go to all appointments, and call your doctor if you are having problems. It's also a good idea to know your test results and keep a list of the medicines you take. How can you care for yourself at home? Medicines    · Take your medicines exactly as prescribed. Call your doctor if you think you are having a problem with your medicine. You will get more details on the specific medicines your doctor prescribes.     · If your doctor has given you a blood thinner to prevent a stroke, be sure you get instructions about how to take your medicine safely.  Blood thinners can cause serious bleeding problems.     · Do not take any vitamins, over-the-counter drugs, or herbal products without talking to your doctor first.    Lifestyle changes    · Do not smoke. Smoking can increase your chance of a stroke and heart attack. If you need help quitting, talk to your doctor about stop-smoking programs and medicines. These can increase your chances of quitting for good.     · Eat a heart-healthy diet.     · Stay at a healthy weight. Lose weight if you need to.     · Limit alcohol to 2 drinks a day for men and 1 drink a day for women. Too much alcohol can cause health problems.     · Avoid colds and flu. Get a pneumococcal vaccine shot. If you have had one before, ask your doctor whether you need another dose. Get a flu shot every year. If you must be around people with colds or flu, wash your hands often. Activity    · If your doctor recommends it, get more exercise. Walking is a good choice. Bit by bit, increase the amount you walk every day. Try for at least 30 minutes on most days of the week. You also may want to swim, bike, or do other activities. Your doctor may suggest that you join a cardiac rehabilitation program so that you can have help increasing your physical activity safely.     · Start light exercise if your doctor says it is okay. Even a small amount will help you get stronger, have more energy, and manage stress. Walking is an easy way to get exercise. Start out by walking a little more than you did in the hospital. Gradually increase the amount you walk.     · When you exercise, watch for signs that your heart is working too hard. You are pushing too hard if you cannot talk while you are exercising. If you become short of breath or dizzy or have chest pain, sit down and rest immediately.     · Check your pulse regularly. Place two fingers on the artery at the palm side of your wrist, in line with your thumb. If your heartbeat seems uneven or fast, talk to your doctor. When should you call for help? Call 911 anytime you think you may need emergency care.  For example, call if:    · You have symptoms of a heart attack. These may include:  ? Chest pain or pressure, or a strange feeling in the chest.  ? Sweating. ? Shortness of breath. ? Nausea or vomiting. ? Pain, pressure, or a strange feeling in the back, neck, jaw, or upper belly or in one or both shoulders or arms. ? Lightheadedness or sudden weakness. ? A fast or irregular heartbeat. After you call 911, the  may tell you to chew 1 adult-strength or 2 to 4 low-dose aspirin. Wait for an ambulance. Do not try to drive yourself.     · You have symptoms of a stroke. These may include:  ? Sudden numbness, tingling, weakness, or loss of movement in your face, arm, or leg, especially on only one side of your body. ? Sudden vision changes. ? Sudden trouble speaking. ? Sudden confusion or trouble understanding simple statements. ? Sudden problems with walking or balance. ? A sudden, severe headache that is different from past headaches.     · You passed out (lost consciousness).    Call your doctor now or seek immediate medical care if:    · You have new or increased shortness of breath.     · You feel dizzy or lightheaded, or you feel like you may faint.     · Your heart rate becomes irregular.     · You can feel your heart flutter in your chest or skip heartbeats. Tell your doctor if these symptoms are new or worse.    Watch closely for changes in your health, and be sure to contact your doctor if you have any problems. Where can you learn more? Go to http://charan-melvin.info/. Enter U020 in the search box to learn more about \"Atrial Fibrillation: Care Instructions. \"  Current as of: April 9, 2019  Content Version: 12.2  © 3219-9531 [employer]. Care instructions adapted under license by [employer] (which disclaims liability or warranty for this information).  If you have questions about a medical condition or this instruction, always ask your healthcare professional. Bethany Enriquez any warranty or liability for your use of this information. Patient Education        Body Mass Index: Care Instructions  Your Care Instructions    Body mass index (BMI) can help you see if your weight is raising your risk for health problems. It uses a formula to compare how much you weigh with how tall you are. · A BMI lower than 18.5 is considered underweight. · A BMI between 18.5 and 24.9 is considered healthy. · A BMI between 25 and 29.9 is considered overweight. A BMI of 30 or higher is considered obese. If your BMI is in the normal range, it means that you have a lower risk for weight-related health problems. If your BMI is in the overweight or obese range, you may be at increased risk for weight-related health problems, such as high blood pressure, heart disease, stroke, arthritis or joint pain, and diabetes. If your BMI is in the underweight range, you may be at increased risk for health problems such as fatigue, lower protection (immunity) against illness, muscle loss, bone loss, hair loss, and hormone problems. BMI is just one measure of your risk for weight-related health problems. You may be at higher risk for health problems if you are not active, you eat an unhealthy diet, or you drink too much alcohol or use tobacco products. Follow-up care is a key part of your treatment and safety. Be sure to make and go to all appointments, and call your doctor if you are having problems. It's also a good idea to know your test results and keep a list of the medicines you take. How can you care for yourself at home? · Practice healthy eating habits. This includes eating plenty of fruits, vegetables, whole grains, lean protein, and low-fat dairy. · If your doctor recommends it, get more exercise. Walking is a good choice. Bit by bit, increase the amount you walk every day. Try for at least 30 minutes on most days of the week. · Do not smoke. Smoking can increase your risk for health problems.  If you need help quitting, talk to your doctor about stop-smoking programs and medicines. These can increase your chances of quitting for good. · Limit alcohol to 2 drinks a day for men and 1 drink a day for women. Too much alcohol can cause health problems. If you have a BMI higher than 25  · Your doctor may do other tests to check your risk for weight-related health problems. This may include measuring the distance around your waist. A waist measurement of more than 40 inches in men or 35 inches in women can increase the risk of weight-related health problems. · Talk with your doctor about steps you can take to stay healthy or improve your health. You may need to make lifestyle changes to lose weight and stay healthy, such as changing your diet and getting regular exercise. If you have a BMI lower than 18.5  · Your doctor may do other tests to check your risk for health problems. · Talk with your doctor about steps you can take to stay healthy or improve your health. You may need to make lifestyle changes to gain or maintain weight and stay healthy, such as getting more healthy foods in your diet and doing exercises to build muscle. Where can you learn more? Go to http://charan-melvin.info/. Enter S176 in the search box to learn more about \"Body Mass Index: Care Instructions. \"  Current as of: October 13, 2016  Content Version: 11.4  © 1468-2002 [employer]. Care instructions adapted under license by [employer] (which disclaims liability or warranty for this information). If you have questions about a medical condition or this instruction, always ask your healthcare professional. [place] [address] any warranty or liability for your use of this information. Patient Education        DASH Diet: Care Instructions  Your Care Instructions    The DASH diet is an eating plan that can help lower your blood pressure.  DASH stands for Dietary Approaches to Stop Hypertension. Hypertension is high blood pressure. The DASH diet focuses on eating foods that are high in calcium, potassium, and magnesium. These nutrients can lower blood pressure. The foods that are highest in these nutrients are fruits, vegetables, low-fat dairy products, nuts, seeds, and legumes. But taking calcium, potassium, and magnesium supplements instead of eating foods that are high in those nutrients does not have the same effect. The DASH diet also includes whole grains, fish, and poultry. The DASH diet is one of several lifestyle changes your doctor may recommend to lower your high blood pressure. Your doctor may also want you to decrease the amount of sodium in your diet. Lowering sodium while following the DASH diet can lower blood pressure even further than just the DASH diet alone. Follow-up care is a key part of your treatment and safety. Be sure to make and go to all appointments, and call your doctor if you are having problems. It's also a good idea to know your test results and keep a list of the medicines you take. How can you care for yourself at home? Following the DASH diet  · Eat 4 to 5 servings of fruit each day. A serving is 1 medium-sized piece of fruit, ½ cup chopped or canned fruit, 1/4 cup dried fruit, or 4 ounces (½ cup) of fruit juice. Choose fruit more often than fruit juice. · Eat 4 to 5 servings of vegetables each day. A serving is 1 cup of lettuce or raw leafy vegetables, ½ cup of chopped or cooked vegetables, or 4 ounces (½ cup) of vegetable juice. Choose vegetables more often than vegetable juice. · Get 2 to 3 servings of low-fat and fat-free dairy each day. A serving is 8 ounces of milk, 1 cup of yogurt, or 1 ½ ounces of cheese. · Eat 6 to 8 servings of grains each day. A serving is 1 slice of bread, 1 ounce of dry cereal, or ½ cup of cooked rice, pasta, or cooked cereal. Try to choose whole-grain products as much as possible.   · Limit lean meat, poultry, and fish to 2 servings each day. A serving is 3 ounces, about the size of a deck of cards. · Eat 4 to 5 servings of nuts, seeds, and legumes (cooked dried beans, lentils, and split peas) each week. A serving is 1/3 cup of nuts, 2 tablespoons of seeds, or ½ cup of cooked beans or peas. · Limit fats and oils to 2 to 3 servings each day. A serving is 1 teaspoon of vegetable oil or 2 tablespoons of salad dressing. · Limit sweets and added sugars to 5 servings or less a week. A serving is 1 tablespoon jelly or jam, ½ cup sorbet, or 1 cup of lemonade. · Eat less than 2,300 milligrams (mg) of sodium a day. If you limit your sodium to 1,500 mg a day, you can lower your blood pressure even more. Tips for success  · Start small. Do not try to make dramatic changes to your diet all at once. You might feel that you are missing out on your favorite foods and then be more likely to not follow the plan. Make small changes, and stick with them. Once those changes become habit, add a few more changes. · Try some of the following:  ? Make it a goal to eat a fruit or vegetable at every meal and at snacks. This will make it easy to get the recommended amount of fruits and vegetables each day. ? Try yogurt topped with fruit and nuts for a snack or healthy dessert. ? Add lettuce, tomato, cucumber, and onion to sandwiches. ? Combine a ready-made pizza crust with low-fat mozzarella cheese and lots of vegetable toppings. Try using tomatoes, squash, spinach, broccoli, carrots, cauliflower, and onions. ? Have a variety of cut-up vegetables with a low-fat dip as an appetizer instead of chips and dip. ? Sprinkle sunflower seeds or chopped almonds over salads. Or try adding chopped walnuts or almonds to cooked vegetables. ? Try some vegetarian meals using beans and peas. Add garbanzo or kidney beans to salads. Make burritos and tacos with mashed pickard beans or black beans. Where can you learn more?   Go to http://charan-melvin.info/. Enter L441 in the search box to learn more about \"DASH Diet: Care Instructions. \"  Current as of: April 9, 2019  Content Version: 12.2  © 1711-1839 [employer]. Care instructions adapted under license by [employer] (which disclaims liability or warranty for this information). If you have questions about a medical condition or this instruction, always ask your healthcare professional. [place] [address] any warranty or liability for your use of this information. Patient Education        Leg and Ankle Edema: Care Instructions  Your Care Instructions  Swelling in the legs, ankles, and feet is called edema. It is common after you sit or stand for a while. Long plane flights or car rides often cause swelling in the legs and feet. You may also have swelling if you have to stand for long periods of time at your job. Problems with the veins in the legs (varicose veins) and changes in hormones can also cause swelling. Sometimes the swelling in the ankles and feet is caused by a more serious problem, such as heart failure, infection, blood clots, or liver or kidney disease. Follow-up care is a key part of your treatment and safety. Be sure to make and go to all appointments, and call your doctor if you are having problems. It's also a good idea to know your test results and keep a list of the medicines you take. How can you care for yourself at home? · If your doctor gave you medicine, take it as prescribed. Call your doctor if you think you are having a problem with your medicine. · Whenever you are resting, raise your legs up. Try to keep the swollen area higher than the level of your heart. · Take breaks from standing or sitting in one position. ? Walk around to increase the blood flow in your lower legs. ? Move your feet and ankles often while you stand, or tighten and relax your leg muscles. · Wear support stockings. Put them on in the morning, before swelling gets worse. · Eat a balanced diet. Lose weight if you need to. · Limit the amount of salt (sodium) in your diet. Salt holds fluid in the body and may increase swelling. When should you call for help? Call 911 anytime you think you may need emergency care. For example, call if:    · You have symptoms of a blood clot in your lung (called a pulmonary embolism). These may include:  ? Sudden chest pain. ? Trouble breathing. ? Coughing up blood.    Call your doctor now or seek immediate medical care if:    · You have signs of a blood clot, such as:  ? Pain in your calf, back of the knee, thigh, or groin. ? Redness and swelling in your leg or groin.     · You have symptoms of infection, such as:  ? Increased pain, swelling, warmth, or redness. ? Red streaks or pus. ? A fever.    Watch closely for changes in your health, and be sure to contact your doctor if:    · Your swelling is getting worse.     · You have new or worsening pain in your legs.     · You do not get better as expected. Where can you learn more? Go to http://charan-melvin.info/. Enter R235 in the search box to learn more about \"Leg and Ankle Edema: Care Instructions. \"  Current as of: June 26, 2019  Content Version: 12.2  © 6545-4083 [employer]. Care instructions adapted under license by [employer] (which disclaims liability or warranty for this information). If you have questions about a medical condition or this instruction, always ask your healthcare professional. [place] [address] any warranty or liability for your use of this information.

## 2019-12-25 NOTE — DISCHARGE SUMMARY
2 [facility]  Hospitalist Division    Discharge Summary    Patient: Abhishek Schwartz MRN: [id number]  CSN: [id number]    YOB: 1960  Age: 61 y.o. Sex: female    DOA: 12/22/2019 LOS:  LOS: 3 days   Discharge Date:      Admission Diagnoses: CHF (congestive heart failure) ([facility] [I50.9]  HTN (hypertension) [I10]    Discharge Diagnoses:    Problem List as of 12/25/2019 Date Reviewed: 9/5/2018          Codes Class Noted - Resolved    * (Principal) Acute on chronic diastolic congestive heart failure ([facility] ICD-10-CM: I50.33  ICD-9-CM: 428.33, 428.0  12/22/2019 - Present        Obese ICD-10-CM: E66.9  ICD-9-CM: 278.00  12/22/2019 - Present        HTN (hypertension) (Chronic) ICD-10-CM: I10  ICD-9-CM: 401.9  9/5/2018 - Present        Acquired hypothyroidism (Chronic) ICD-10-CM: E03.9  ICD-9-CM: 244.9  9/5/2018 - Present        Atypical chest pain ICD-10-CM: R07.89  ICD-9-CM: 786.59  9/4/2018 - Present        A-fib ([facility] ICD-10-CM: I48.91  ICD-9-CM: 427.31  9/4/2018 - Present              Discharge Condition: Stable    Discharge To: Home    Consults: Cardiology    Procedures: None    Hospital Course: \"Courtney Chan is a 61 y.o. female with a PMHx HTN, hypothyroid, CAD, and of recent dx of afib who presented to the ED with complaints of worsening dyspnea for the past few days. Tommy Rodrigues was seen at urgent care and sent to the ED for HTN which was initially in the 190s/160s and afib in the 130s.  She was seen in the ED dec 17th for ADHF, elevated troponin, rapid afib, /110 and declined admission.  She denies a hx of HF but reports new orthopnea, RODGERS, PND, pedal edema, and fatigue.  She has a cardiologist referal for new afib dx but has yet to see him.          In the ED she is hypervolemic, afib RVR, elevated BNP worse from dec 17th visit. Troponin is better though.  Will be admitted for further eval \"     ADHF treated with BB, ACEi, and bumex gtt.   Net negative since admission with improvement of symptoms. Cardiology consulted - she was started on eliquis for recent diagnoses of Afib with RVR. Risks/benefits discussed with patient. Transitioned to oral diuretics. Electrolytes replaced as needed. Dig added for afib with controlled rates. Asymptomatic. HTN was controlled with oral agents and up titrated as needed. VSS. Ready for discharge. Discharge Recommendations: Follow up with PCP and Cardiology in 2-3 weeks. Physical Exam:  General appearance: alert, cooperative, no distress, appears stated age  Head: Normocephalic, without obvious abnormality, atraumatic  Lungs: clear to auscultation bilaterally  Heart: IRIR, S1, S2 normal, no murmur, click, rub or gallop  Abdomen: soft, non-tender. Bowel sounds normal. No masses,  no organomegaly  Extremities: extremities normal, atraumatic, no cyanosis or edema  Skin: Skin color, texture, turgor normal. No rashes or lesions  Neurologic: Grossly normal  PSY: Mood and affect normal, appropriately behaved    Significant Diagnostic Studies: All lab results for the last 24 hours reviewed. No results found. Discharge Medications:     Current Discharge Medication List      START taking these medications    Details   amLODIPine (NORVASC) 10 mg tablet Take 1 Tab by mouth daily. Qty: 30 Tab, Refills: 0      apixaban (ELIQUIS) 5 mg tablet Take 1 Tab by mouth two (2) times a day. Qty: 60 Tab, Refills: 0      bumetanide (BUMEX) 1 mg tablet Take 1 Tab by mouth daily. Qty: 30 Tab, Refills: 0      digoxin (LANOXIN) 0.125 mg tablet Take 1 Tab by mouth daily. Qty: 30 Tab, Refills: 0      spironolactone (ALDACTONE) 25 mg tablet Take 1 Tab by mouth daily. Qty: 30 Tab, Refills: 0         CONTINUE these medications which have CHANGED    Details   lisinopril (PRINIVIL, ZESTRIL) 40 mg tablet Take 1 Tab by mouth daily.   Qty: 30 Tab, Refills: 0      metoprolol tartrate (LOPRESSOR) 50 mg tablet Take 1 Tab by mouth every twelve (12) hours.  Qty: 60 Tab, Refills: 0         CONTINUE these medications which have NOT CHANGED    Details   levothyroxine (SYNTHROID) 100 mcg tablet Take 100 mcg by mouth Daily (before breakfast). atorvastatin (LIPITOR) 40 mg tablet Take 1 Tab by mouth daily.   Qty: 30 Tab, Refills: 0         STOP taking these medications       aspirin 81 mg chewable tablet Comments:   Reason for Stopping:               Activity: Activity as tolerated    Diet: Cardiac Diet    Wound Care: None needed    Follow-up: PCP, Cardiology     Discharge time: >35 Minutes     Luis Eduardo Arvizu [address]  Pager: [phone]  Office: [phone]    12/25/2019, 1:43 PM

## 2019-12-25 NOTE — PROGRESS NOTES
[facility]  -  Progress Note      Patient: Terry Haddad MRN: [id number]  SSN: [id number]    YOB: 1960  Age: 61 y.o. Sex: female      Admit Date: 12/22/2019    Assessment:     -CHF, new diagnosis. BNP 7526 with mild central vascular congestion on presentation 12/22/19.  -Echo with LVEF 40-45%  -Atrial fibrillation, seen as far back as 09/2018 on EKG. States has not been on anticoagulation in the past.  [id number] score of 4 (+ HTN, + female, +2 Hx TIA)  -Hx TIA 4+ years ago  -HTN, on Lisinopril  -HLD  -Hx hypothyroidism, on Synthroid as outpatient  -Morbid obesity    Plan: Will order 2 g of IV magnesium replacement because of low magnesium level yesterday in setting of diuresis  Patient is feeling much better now. 13 L negative fluid balance  Currently on amlodipine, started yesterday and metoprolol and lisinopril  Continue Bumex maintenance dose and Aldactone  Continue digoxin  Salt and fluid restriction discussed with the patient. Patient was offered to follow-up with our clinic however she has appointment scheduled with [facility] cardiology team.  No further cardiac work up planned at this time unless clinical status changes. Call us back if needed. Will be available as needed. Will need to follow up in cardiology clinic in 2-3 weeks after discharge. Thank you. Subjective:     No new complaints. Dyspnea is overall better.   Feeling good and would like to go home    Objective:      Patient Vitals for the past 8 hrs:   Temp Pulse Resp BP SpO2   12/25/19 0746 98.7 °F (37.1 °C) 89 16 (!) 143/97 97 %   12/25/19 0428 98 °F (36.7 °C) 93  (!) 152/101 100 %         Patient Vitals for the past 96 hrs:   Weight   12/25/19 0528 245 lb 7.2 oz (111.3 kg)   12/24/19 0424 247 lb 5.7 oz (112.2 kg)   12/23/19 0945 266 lb (120.7 kg)   12/23/19 0759 251 lb 4.8 oz (114 kg)   12/23/19 0505 255 lb (115.7 kg)   12/22/19 1903 266 lb 11.2 oz (121 kg)   12/22/19 1424 235 lb (106.6 kg)         Intake/Output Summary (Last 24 hours) at 12/25/2019 1006  Last data filed at 12/25/2019 0530  Gross per 24 hour   Intake 840 ml   Output 2100 ml   Net -1260 ml       Physical Exam:  General:  alert, cooperative, no distress, appears stated age  Neck:  Mild JVD  Lungs:  clear to auscultation bilaterally  Heart:  Irregularly irregular rate and rhythm, S1, S2 normal, no murmur, click, rub or gallop  Abdomen:  abdomen is soft without significant tenderness, masses, organomegaly or guarding  Extremities: No significant edema    Data Review:     Labs: Results:       Chemistry Recent Labs     12/25/19  0345 12/24/19  0200 12/23/19  0530 12/22/19  1546   GLU 82 78 88 98    144 144 142   K 3.4* 3.3* 3.0* 3.3*   CL 99* 100 103 106   CO2 37* 37* 33* 30   BUN 24* 28* 29* 32*   CREA 1.09 1.37* 1.12 1.26   CA 8.6 9.4 10.0 9.0   MG  --  1.2* 1.2*  --    AGAP 6 7 8 6   BUCR 22* 20 26* 25*   AP  --   --   --  180*   TP  --   --   --  6.3*   ALB  --   --   --  3.5   GLOB  --   --   --  2.8   AGRAT  --   --   --  1.3      CBC w/Diff Recent Labs     12/23/19  0530 12/22/19  1546   WBC 9.8 9.4   RBC 4.66 4.34   HGB 14.4 13.7   HCT 45.8* 42.6    178   GRANS 73 77*   LYMPH 17* 13*   EOS 1 0      Cardiac Enzymes No results found for: CPK, CK, CKMMB, CKMB, RCK3, CKMBT, CKNDX, CKND1, SUE, TROPT, TROIQ, VÍCTOR, TROPT, TNIPOC, BNP, BNPP   Coagulation No results for input(s): PTP, INR, APTT, INREXT, INREXT in the last 72 hours.     Lipid Panel Lab Results   Component Value Date/Time    Cholesterol, total 204 (H) 09/05/2018 04:30 AM    HDL Cholesterol 54 09/05/2018 04:30 AM    LDL, calculated 131.8 (H) 09/05/2018 04:30 AM    VLDL, calculated 18.2 09/05/2018 04:30 AM    Triglyceride 91 09/05/2018 04:30 AM    CHOL/HDL Ratio 3.8 09/05/2018 04:30 AM      BNP No results found for: BNP, BNPP, XBNPT   Liver Enzymes Recent Labs     12/22/19  1546   TP 6.3*   ALB 3.5   *   SGOT 52*      Digoxin    Thyroid Studies Lab Results Component Value Date/Time    TSH 0.84 12/22/2019 03:46 PM           James Zaragoza MD   December 25, 2019

## 2019-12-25 NOTE — PROGRESS NOTES
Bedside shift change report given to INDIRA Santos (oncoming nurse) by Xena Wick RN (offgoing nurse). Report included the following information SBAR, Kardex, Intake/Output, MAR and Recent Results.      0848 -- AM medications given, well tolerated, will continue to monitor.      1129 -- Reassessment completed, no change in patient condition, will continue to monitor.       -- Reassessment completed, no change in patient condition, will continue to monitor.      -- Patient discharged.

## 2019-12-25 NOTE — PROGRESS NOTES
Discharge/Transition Planning   Problem: Discharge Planning  Goal: *Discharge to safe environment  Outcome: Resolved/Met   Home and follow up outpatient    Care Management Interventions  PCP Verified by CM: Yes(Dr Jeffry Cruz)  Last Visit to PCP: 06/12/19  Mode of Transport at Discharge:  Other (see comment)(family)  Transition of Care Consult (CM Consult): Discharge Planning  Current Support Network: Own Home(with Mom)  Confirm Follow Up Transport: Self  The Patient and/or Patient Representative was Provided with a Choice of Provider and Agrees with the Discharge Plan?: Yes  Discharge Location  Discharge Placement: Home

## 2019-12-31 NOTE — PROGRESS NOTES
Paroxysmal Atrial Fibrillation    Suzanna Allison, DO  Internal Medicine, Hospitalist  Pager: [phone]  [address] [facility]

## 2020-01-21 ENCOUNTER — OFFICE VISIT (OUTPATIENT)
Dept: CARDIOLOGY CLINIC | Age: 60
End: 2020-01-21

## 2020-01-21 VITALS
BODY MASS INDEX: 37.44 KG/M2 | HEART RATE: 83 BPM | DIASTOLIC BLOOD PRESSURE: 64 MMHG | SYSTOLIC BLOOD PRESSURE: 97 MMHG | OXYGEN SATURATION: 98 % | WEIGHT: 225 LBS

## 2020-01-21 DIAGNOSIS — E66.01 SEVERE OBESITY (HCC): ICD-10-CM

## 2020-01-21 DIAGNOSIS — I42.9 CARDIOMYOPATHY, UNSPECIFIED TYPE (HCC): Primary | ICD-10-CM

## 2020-01-21 NOTE — PROGRESS NOTES
1. Have you been to the ER, urgent care clinic since your last visit? Hospitalized since your last visit? No    2. Have you seen or consulted any other health care providers outside of the [address] [place] [address] since your last visit? Include any pap smears or colon screening.  No

## 2020-01-21 NOTE — PROGRESS NOTES
[facility]    Ms. Saman Walters is a 40-year-old female with a history of atrial fibrillation, cardiomyopathy, hypertension, hypothyroidism and TIA    Patient is here today for follow-up appointment. She was recently admitted to the hospital with atrial fibrillation. She was found to have a mild LV dysfunction. She since she has gone home, she has been feeling much better. She denies any symptoms to suggest angina or heart failure. She denies any palpitation, presyncope or syncope overall she thinks that she is in much better shape compared to when she went to the hospital.  She denies lower extremity edema. Denies any nausea, vomiting, abdominal pain, fever, chills, sputum production. No hematuria or other bleeding complaints    Past Medical History:   Diagnosis Date    A-fib [facility]) 2018    CAD (coronary artery disease)     Hypertension     Hypothyroid     TIA (transient ischemic attack)          Past Surgical History:   Procedure Laterality Date    HX  SECTION         Current Outpatient Medications   Medication Sig    lisinopril (PRINIVIL, ZESTRIL) 40 mg tablet Take 1 Tab by mouth daily.  metoprolol tartrate (LOPRESSOR) 50 mg tablet Take 1 Tab by mouth every twelve (12) hours.  apixaban (ELIQUIS) 5 mg tablet Take 1 Tab by mouth two (2) times a day.  bumetanide (BUMEX) 1 mg tablet Take 1 Tab by mouth daily.  digoxin (LANOXIN) 0.125 mg tablet Take 1 Tab by mouth daily.  spironolactone (ALDACTONE) 25 mg tablet Take 1 Tab by mouth daily.  atorvastatin (LIPITOR) 40 mg tablet Take 1 Tab by mouth daily.  levothyroxine (SYNTHROID) 100 mcg tablet Take 100 mcg by mouth Daily (before breakfast). No current facility-administered medications for this visit.         Allergies and Sensitivities:  Allergies   Allergen Reactions    Amlodipine Rash and Nausea and Vomiting    Ampicillin Rash    Penicillins Unknown (comments)     Syncope    Seafood Rash    Shellfish Derived Hives       Family History:  History reviewed. No pertinent family history. Social History:  Social History     Tobacco Use    Smoking status: Never Smoker    Smokeless tobacco: Never Used   Substance Use Topics    Alcohol use: Yes    Drug use: No     She  reports that she has never smoked. She has never used smokeless tobacco.  She  reports current alcohol use. Review of Systems:  Cardiac symptoms as noted above in HPI. All others negative. Denies fatigue, malaise, skin rash, joint pain, blurring vision, photophobia, neck pain, hemoptysis, chronic cough, nausea, vomiting, hematuria, burning micturition, BRBPR, chronic headaches. Physical Exam:  BP Readings from Last 3 Encounters:   01/21/20 97/64   12/25/19 (!) 144/93   12/17/19 (!) 145/115         Pulse Readings from Last 3 Encounters:   01/21/20 83   12/25/19 87   12/17/19 (!) 110          Wt Readings from Last 3 Encounters:   01/21/20 225 lb (102.1 kg)   12/25/19 245 lb 7.2 oz (111.3 kg)   12/17/19 235 lb (106.6 kg)       Constitutional: Oriented to person, place, and time. HENT: Head: Normocephalic and atraumatic. Neck: No JVD present. Carotid bruit is not appreciated. Cardiovascular: Irregular hythm. No murmur, gallop or rubs appreciated  Lung: Breath sounds normal. No respiratory distress. No ronchi or rales appreciated  Abdominal: No tenderness. No rebound and no guarding. Musculoskeletal: There is no lower extremity edema.  No cynosis        Review of Data  LABS:   Lab Results   Component Value Date/Time    Sodium 142 12/25/2019 03:45 AM    Potassium 3.4 (L) 12/25/2019 03:45 AM    Chloride 99 (L) 12/25/2019 03:45 AM    CO2 37 (H) 12/25/2019 03:45 AM    Glucose 82 12/25/2019 03:45 AM    BUN 24 (H) 12/25/2019 03:45 AM    Creatinine 1.09 12/25/2019 03:45 AM     Lipids Latest Ref Rng & Units 9/5/2018   Chol, Total <200 MG/(H)   HDL 40 - 60 MG/DL 54   LDL 0 - 100 MG/DL 131.8(H)   Trig <150 MG/DL 91   Chol/HDL Ratio 0 - 5.0   3.8   Some recent data might be hidden     Lab Results   Component Value Date/Time    ALT (SGPT) 67 (H) 12/22/2019 03:46 PM     No results found for: HBA1C, HGBE8, [id number], [id number], [id number]    EKG    ECHO (12/19)  Left Ventricle Normal cavity size. Moderate concentric hypertrophy . Mild-to-moderate systolic dysfunction. The estimated ejection fraction is 40 - 45%. Visually measured ejection fraction. No regional wall motion abnormality noted. Atrial fibrillation observed. Wall Scoring The left ventricular wall motion is normal.            Left Atrium Moderately dilated left atrium. Left Atrium volume index is 41.92 mL/m2. Right Ventricle Normal cavity size. Severely reduced systolic function. Right Atrium Severely dilated right atrium. Aortic Valve Trileaflet valve structure, no stenosis and no regurgitation. Mitral Valve No stenosis. Mitral valve non-specific thickening. Trace regurgitation. Tricuspid Valve Normal valve structure and no stenosis. Moderate regurgitation. Pulmonary arterial systolic pressure is 76.8 mmHg. Moderate pulmonary hypertension. Pulmonic Valve Normal valve structure and no stenosis. Trace regurgitation. Aorta Normal aortic root and ascending aorta. STRESS TEST (EST, PHARM, NUC, ECHO etc)    CATHETERIZATION    IMPRESSION & PLAN:  Ms. Alan Santana is 44-year-old female with cardiomyopathy, A. fib, hypertension, hypothyroidism    Cardiomyopathy:  Patient was diagnosed with cardiomyopathy with LVEF 45% in December 2019. Currently NYHA class I/II. No evidence of fluid overload. She is on lisinopril and beta-blocker. Continue with the Bumex maintenance dose. Repeat echo in 3 months  On bumex and aldactone. A. fib:  Initial diagnosis in September 2018. On exam remains in A. fib. Heart rate controlled. Continue beta-blocker and digoxin. Continue Eliquis.   No side effect from medication  Currently asymptomatic  If LVEF on repeat echo shows worsening, would consider rhythm control strategy    Hypertension:  BP stable. Asymptomatic. Continue lisinopril, metoprolol. Patient stopped amlodipine because of side effect. No need to restart    This plan was discussed with patient who is in agreement. Thank you for allowing me to participate in patient care. Please feel free to call me if you have any question or concern. Rukhsana Jacob MD  Please note: This document has been produced using voice recognition software. Unrecognized errors in transcription may be present.

## 2020-02-24 ENCOUNTER — TELEPHONE (OUTPATIENT)
Dept: CARDIOLOGY CLINIC | Age: 60
End: 2020-02-24

## 2020-02-24 NOTE — TELEPHONE ENCOUNTER
Two patient Identifiers confirmed. Pt stated she has been taking eliquis since christmas but has noticed a rash on her face and a light cycle since starting medication. Pt would like an alternative mediation. Advised I would consult with Dr Treva Plasencia and advise.    [facility] [address] [place] [address] address

## 2020-02-25 NOTE — TELEPHONE ENCOUNTER
Contacted pt at [facility] number. Two patient Identifiers confirmed. Advised pt per Dr Venus Alexander. Pt verbalized understanding and scheduled for Thursday, March 05, 2020 01:15 PM. Pt declined sooner appt slot that was available.

## 2020-03-05 ENCOUNTER — OFFICE VISIT (OUTPATIENT)
Dept: CARDIOLOGY CLINIC | Age: 60
End: 2020-03-05

## 2020-03-05 VITALS
BODY MASS INDEX: 37.61 KG/M2 | HEART RATE: 76 BPM | OXYGEN SATURATION: 97 % | WEIGHT: 226 LBS | SYSTOLIC BLOOD PRESSURE: 127 MMHG | DIASTOLIC BLOOD PRESSURE: 85 MMHG

## 2020-03-05 DIAGNOSIS — I48.0 PAF (PAROXYSMAL ATRIAL FIBRILLATION) (HCC): ICD-10-CM

## 2020-03-05 DIAGNOSIS — I42.9 CARDIOMYOPATHY, UNSPECIFIED TYPE (HCC): Primary | ICD-10-CM

## 2020-03-05 NOTE — PROGRESS NOTES
1. Have you been to the ER, urgent care clinic since your last visit? Hospitalized since your last visit? No    2. Have you seen or consulted any other health care providers outside of the [address] [place] [address] since your last visit? Include any pap smears or colon screening.  No

## 2020-03-05 NOTE — PROGRESS NOTES
[facility]    Ms. Shawnee Gunn is a 61 y.o. female with a history of atrial fibrillation, cardiomyopathy, hypertension, hypothyroidism and TIA    Patient is here today for follow-up appointment. She had developed some facial rash and hives with some itching since starting Eliquis and Aldactone after hospital discharge. She has reduced the dose of Eliquis to once a day and she thinks that her itching and rash is better. She denies any chest pain or chest tightness. She denies any palpitation, presyncope or syncope. She denies any PND or lower extremity swelling  Denies any nausea, vomiting, abdominal pain, fever, chills, sputum production. No hematuria or other bleeding complaints    Past Medical History:   Diagnosis Date    A-fib [facility]) 2018    CAD (coronary artery disease)     Cardiomyopathy (HCC)     LVEF 40-45% ()     Hypertension     Hypothyroid     TIA (transient ischemic attack)          Past Surgical History:   Procedure Laterality Date    HX  SECTION         Current Outpatient Medications   Medication Sig    lisinopril (PRINIVIL, ZESTRIL) 40 mg tablet Take 1 Tab by mouth daily.  metoprolol tartrate (LOPRESSOR) 50 mg tablet Take 1 Tab by mouth every twelve (12) hours.  bumetanide (BUMEX) 1 mg tablet Take 1 Tab by mouth daily.  digoxin (LANOXIN) 0.125 mg tablet Take 1 Tab by mouth daily.  spironolactone (ALDACTONE) 25 mg tablet Take 1 Tab by mouth daily.  levothyroxine (SYNTHROID) 100 mcg tablet Take 100 mcg by mouth Daily (before breakfast).  atorvastatin (LIPITOR) 40 mg tablet Take 1 Tab by mouth daily. No current facility-administered medications for this visit.         Allergies and Sensitivities:  Allergies   Allergen Reactions    Amlodipine Rash and Nausea and Vomiting    Ampicillin Rash    Penicillins Unknown (comments)     Syncope    Seafood Rash    Shellfish Derived Hives Family History:  History reviewed. No pertinent family history. Social History:  Social History     Tobacco Use    Smoking status: Never Smoker    Smokeless tobacco: Never Used   Substance Use Topics    Alcohol use: Yes    Drug use: No     She  reports that she has never smoked. She has never used smokeless tobacco.  She  reports current alcohol use. Review of Systems:  Cardiac symptoms as noted above in HPI. All others negative. Denies fatigue, malaise, skin rash, joint pain, blurring vision, photophobia, neck pain, hemoptysis, chronic cough, nausea, vomiting, hematuria, burning micturition, BRBPR, chronic headaches. Physical Exam:  BP Readings from Last 3 Encounters:   03/05/20 127/85   01/21/20 97/64   12/25/19 (!) 144/93         Pulse Readings from Last 3 Encounters:   03/05/20 76   01/21/20 83   12/25/19 87          Wt Readings from Last 3 Encounters:   03/05/20 226 lb (102.5 kg)   01/21/20 225 lb (102.1 kg)   12/25/19 245 lb 7.2 oz (111.3 kg)       Constitutional: Oriented to person, place, and time. HENT: Head: Normocephalic and atraumatic. Neck: No JVD present. Carotid bruit is not appreciated. Cardiovascular: regular hythm. No murmur, gallop or rubs appreciated  Lung: Breath sounds normal. No respiratory distress. No ronchi or rales appreciated  Abdominal: No tenderness. No rebound and no guarding. Musculoskeletal: There is no lower extremity edema. No cynosis        Review of Data  LABS:   Lab Results   Component Value Date/Time    Sodium 142 12/25/2019 03:45 AM    Potassium 3.4 (L) 12/25/2019 03:45 AM    Chloride 99 (L) 12/25/2019 03:45 AM    CO2 37 (H) 12/25/2019 03:45 AM    Glucose 82 12/25/2019 03:45 AM    BUN 24 (H) 12/25/2019 03:45 AM    Creatinine 1.09 12/25/2019 03:45 AM     Lipids Latest Ref Rng & Units 9/5/2018   Chol, Total <200 MG/(H)   HDL 40 - 60 MG/DL 54   LDL 0 - 100 MG/. 8(H)   Trig <150 MG/DL 91   Chol/HDL Ratio 0 - 5.0   3.8   Some recent data might be hidden     Lab Results   Component Value Date/Time    ALT (SGPT) 67 (H) 12/22/2019 03:46 PM     No results found for: HBA1C, HGBE8, HJS8QHDM, FNC9BHIU, [id number]    EKG    ECHO (12/19)  Left Ventricle Normal cavity size. Moderate concentric hypertrophy . Mild-to-moderate systolic dysfunction. The estimated ejection fraction is 40 - 45%. Visually measured ejection fraction. No regional wall motion abnormality noted. Atrial fibrillation observed. Wall Scoring The left ventricular wall motion is normal.            Left Atrium Moderately dilated left atrium. Left Atrium volume index is 41.92 mL/m2. Right Ventricle Normal cavity size. Severely reduced systolic function. Right Atrium Severely dilated right atrium. Aortic Valve Trileaflet valve structure, no stenosis and no regurgitation. Mitral Valve No stenosis. Mitral valve non-specific thickening. Trace regurgitation. Tricuspid Valve Normal valve structure and no stenosis. Moderate regurgitation. Pulmonary arterial systolic pressure is 43.5 mmHg. Moderate pulmonary hypertension. Pulmonic Valve Normal valve structure and no stenosis. Trace regurgitation. Aorta Normal aortic root and ascending aorta. STRESS TEST (EST, PHARM, NUC, ECHO etc)    CATHETERIZATION    IMPRESSION & PLAN:  Ms. Heidi Palomares is 59-year-old female with cardiomyopathy, A. fib, hypertension, hypothyroidism    Cardiomyopathy:  Patient was diagnosed with cardiomyopathy with LVEF 45% in December 2019. Currently NYHA class I/II. No evidence of fluid overload. She is on lisinopril and beta-blocker. Currently on Bumex and Aldactone. Will consider repeating echocardiogram in 3 months    A. fib:  Initial diagnosis in September 2018. Likely paroxysmal in nature. On exam appears to be in sinus. Heart rate controlled. Continue beta-blocker and digoxin. Initially was on Eliquis however patient developed some facial rash and itching. She thinks it is because of Eliquis.   She would like to try alternate medication. Skin rash can be read side effect. We will stop Eliquis and start Xarelto 20 mg daily. She understands the instruction not to take Eliquis and Xarelto both together    Hypertension:  Continue current medications. /80 5 mmHg    This plan was discussed with patient who is in agreement. Thank you for allowing me to participate in patient care. Please feel free to call me if you have any question or concern. Ben Lujan MD  Please note: This document has been produced using voice recognition software. Unrecognized errors in transcription may be present.

## 2020-03-18 ENCOUNTER — TELEPHONE (OUTPATIENT)
Dept: CARDIOLOGY CLINIC | Age: 60
End: 2020-03-18

## 2020-03-18 RX ORDER — DABIGATRAN ETEXILATE 150 MG/1
CAPSULE ORAL EVERY 12 HOURS
COMMUNITY
End: 2020-03-18 | Stop reason: SDUPTHER

## 2020-03-18 NOTE — TELEPHONE ENCOUNTER
Pt called complaining of severe leg cramps and pain, she says it started a week ago after starting Xarelto. Verbal order and read back per Mery Portillo MD.   Stop  xarelto and start on Pradaxa 150 mg BID.

## 2020-03-19 RX ORDER — DABIGATRAN ETEXILATE 150 MG/1
150 CAPSULE ORAL EVERY 12 HOURS
Qty: 60 CAP | Refills: 5 | Status: SHIPPED | OUTPATIENT
Start: 2020-03-19 | End: 2021-04-09

## 2020-04-21 ENCOUNTER — HOSPITAL ENCOUNTER (OUTPATIENT)
Dept: NON INVASIVE DIAGNOSTICS | Age: 60
Discharge: HOME | End: 2020-04-21
Attending: INTERNAL MEDICINE
Payer: MEDICAID

## 2020-04-21 VITALS
BODY MASS INDEX: 37.65 KG/M2 | DIASTOLIC BLOOD PRESSURE: 85 MMHG | WEIGHT: 226 LBS | HEIGHT: 65 IN | SYSTOLIC BLOOD PRESSURE: 127 MMHG

## 2020-04-21 DIAGNOSIS — I42.9 CARDIOMYOPATHY, UNSPECIFIED TYPE (HCC): ICD-10-CM

## 2020-04-21 LAB
ECHO AO ASC DIAM: 3.04 CM
ECHO AO ROOT DIAM: 2.73 CM
ECHO IVC SNIFF: 2.22 CM
ECHO LA MAJOR AXIS: 4.64 CM
ECHO LA TO AORTIC ROOT RATIO: 1.7
ECHO LV EDV A2C: 90.1 ML
ECHO LV EDV A4C: 108.6 ML
ECHO LV EDV BP: 100.8 ML (ref 56–104)
ECHO LV EDV INDEX A4C: 52.1 ML/M2
ECHO LV EDV INDEX BP: 48.4 ML/M2
ECHO LV EDV NDEX A2C: 43.2 ML/M2
ECHO LV EDV TEICHHOLZ: 0.53 ML
ECHO LV EJECTION FRACTION A2C: 39 %
ECHO LV EJECTION FRACTION A4C: 51 %
ECHO LV EJECTION FRACTION BIPLANE: 45.8 % (ref 55–100)
ECHO LV ESV A2C: 55.2 ML
ECHO LV ESV A4C: 53.3 ML
ECHO LV ESV BP: 54.7 ML (ref 19–49)
ECHO LV ESV INDEX A2C: 26.5 ML/M2
ECHO LV ESV INDEX A4C: 25.6 ML/M2
ECHO LV ESV INDEX BP: 26.3 ML/M2
ECHO LV ESV TEICHHOLZ: 0.22 ML
ECHO LV INTERNAL DIMENSION DIASTOLIC: 4.38 CM (ref 3.9–5.3)
ECHO LV INTERNAL DIMENSION SYSTOLIC: 3.03 CM
ECHO LV IVSD: 1.45 CM (ref 0.6–0.9)
ECHO LV MASS 2D: 236.8 G (ref 67–162)
ECHO LV MASS INDEX 2D: 113.7 G/M2 (ref 43–95)
ECHO LV POSTERIOR WALL DIASTOLIC: 1.04 CM (ref 0.6–0.9)
ECHO LVOT DIAM: 1.85 CM
ECHO RA MINOR AXIS: 3.55 CM
ECHO TV REGURGITANT MAX VELOCITY: 324 CM/S
ECHO TV REGURGITANT PEAK GRADIENT: 42.1 MMHG
LVFS 2D: 30.77 %
LVSV (MOD BI): 21.27 ML
LVSV (MOD SINGLE 4C): 25.53 ML
LVSV (MOD SINGLE): 16.1 ML
LVSV (TEICH): 23.43 ML

## 2020-04-21 PROCEDURE — 93306 TTE W/DOPPLER COMPLETE: CPT

## 2020-05-01 ENCOUNTER — TELEPHONE (OUTPATIENT)
Dept: CARDIOLOGY CLINIC | Age: 60
End: 2020-05-01

## 2020-05-01 NOTE — TELEPHONE ENCOUNTER
Incoming from pt. Two patient Identifiers confirmed. Advised she wanted to know results of her echo. Advised pt Dr Diandra Meza was not in office today and would be back in office on May 5th and I would consult with him and advise. Pt verbalized understanding.

## 2020-05-05 NOTE — TELEPHONE ENCOUNTER
Contacted pt at [facility] number. Two patient Identifiers confirmed. Advised pt per Dr Alcaraz Self. Pt verbalized understanding.

## 2021-04-15 RX ORDER — DABIGATRAN ETEXILATE 150 MG/1
150 CAPSULE ORAL 2 TIMES DAILY
Qty: 60 CAP | Refills: 1 | Status: SHIPPED | OUTPATIENT
Start: 2021-04-15 | End: 2021-08-24 | Stop reason: SDUPTHER

## 2021-08-24 NOTE — TELEPHONE ENCOUNTER
PCP: Tonette Galeazzi, MD    Last appt: Visit date not found  No future appointments. Requested Prescriptions     Pending Prescriptions Disp Refills    dabigatran etexilate (Pradaxa) 150 mg capsule 60 Capsule 0     Sig: Take 1 Capsule by mouth two (2) times a day. Appt due. Other:   Apt overdue. 30 day supply sent and noted.

## 2021-08-26 RX ORDER — DABIGATRAN ETEXILATE 150 MG/1
150 CAPSULE ORAL 2 TIMES DAILY
Qty: 60 CAPSULE | Refills: 0 | Status: SHIPPED | OUTPATIENT
Start: 2021-08-26 | End: 2022-03-16 | Stop reason: SDUPTHER

## 2021-11-22 ENCOUNTER — HOSPITAL ENCOUNTER (OUTPATIENT)
Dept: LAB | Age: 61
Discharge: HOME | End: 2021-11-22

## 2021-11-22 LAB — SENTARA SPECIMEN COL,SENBCF: NORMAL

## 2021-11-22 PROCEDURE — 99001 SPECIMEN HANDLING PT-LAB: CPT

## 2022-03-16 NOTE — TELEPHONE ENCOUNTER
PCP: Zahida Arora MD    Last appt: Visit date not found  Future Appointments   Date Time Provider Jose M Rodriguez   5/5/2022 11:15 AM Pj Guthrie MD [facility]       Requested Prescriptions     Pending Prescriptions Disp Refills    dabigatran etexilate (Pradaxa) 150 mg capsule 60 Capsule 0     Sig: Take 1 Capsule by mouth two (2) times a day. Appt due.
Requested Prescriptions     Pending Prescriptions Disp Refills    dabigatran etexilate (Pradaxa) 150 mg capsule 60 Capsule 0     Sig: Take 1 Capsule by mouth two (2) times a day. Appt due.      Patient scheduled on 05/05 and needs a supply to last until then
30-Jul-2019 13:24

## 2022-03-17 RX ORDER — DABIGATRAN ETEXILATE 150 MG/1
150 CAPSULE ORAL 2 TIMES DAILY
Qty: 60 CAPSULE | Refills: 3 | Status: SHIPPED | OUTPATIENT
Start: 2022-03-17

## 2022-03-18 PROBLEM — E66.01 SEVERE OBESITY (HCC): Status: ACTIVE | Noted: 2020-01-21

## 2022-03-19 PROBLEM — I50.33 ACUTE ON CHRONIC DIASTOLIC CONGESTIVE HEART FAILURE (HCC): Status: ACTIVE | Noted: 2019-12-22

## 2022-03-19 PROBLEM — I10 HTN (HYPERTENSION): Status: ACTIVE | Noted: 2018-09-05

## 2022-03-20 PROBLEM — R07.89 ATYPICAL CHEST PAIN: Status: ACTIVE | Noted: 2018-09-04

## 2024-02-12 ENCOUNTER — OFFICE VISIT (OUTPATIENT)
Age: 64
End: 2024-02-12
Payer: MEDICAID

## 2024-02-12 VITALS
HEIGHT: 65 IN | RESPIRATION RATE: 16 BRPM | OXYGEN SATURATION: 98 % | HEART RATE: 72 BPM | SYSTOLIC BLOOD PRESSURE: 130 MMHG | BODY MASS INDEX: 38.32 KG/M2 | TEMPERATURE: 97.1 F | WEIGHT: 230 LBS | DIASTOLIC BLOOD PRESSURE: 98 MMHG

## 2024-02-12 DIAGNOSIS — I11.0 HYPERTENSIVE HEART DISEASE WITH CHRONIC DIASTOLIC CONGESTIVE HEART FAILURE (HCC): ICD-10-CM

## 2024-02-12 DIAGNOSIS — I50.32 CHRONIC DIASTOLIC CHF (CONGESTIVE HEART FAILURE) (HCC): ICD-10-CM

## 2024-02-12 DIAGNOSIS — I48.20 CHRONIC A-FIB (HCC): Primary | ICD-10-CM

## 2024-02-12 DIAGNOSIS — I50.32 HYPERTENSIVE HEART DISEASE WITH CHRONIC DIASTOLIC CONGESTIVE HEART FAILURE (HCC): ICD-10-CM

## 2024-02-12 PROCEDURE — 3075F SYST BP GE 130 - 139MM HG: CPT

## 2024-02-12 PROCEDURE — 3080F DIAST BP >= 90 MM HG: CPT

## 2024-02-12 PROCEDURE — 99214 OFFICE O/P EST MOD 30 MIN: CPT

## 2024-02-12 RX ORDER — METOPROLOL TARTRATE 75 MG/1
150 TABLET, FILM COATED ORAL 2 TIMES DAILY
Qty: 180 TABLET | Refills: 2 | Status: SHIPPED | OUTPATIENT
Start: 2024-02-12

## 2024-02-12 RX ORDER — LEVOTHYROXINE SODIUM 88 MCG
88 TABLET ORAL DAILY
COMMUNITY
Start: 2024-02-07

## 2024-02-12 RX ORDER — NIFEDIPINE 30 MG/1
30 TABLET, EXTENDED RELEASE ORAL EVERY EVENING
Qty: 90 TABLET | Refills: 2 | Status: SHIPPED | OUTPATIENT
Start: 2024-02-12

## 2024-02-12 RX ORDER — NIFEDIPINE 30 MG/1
30 TABLET, EXTENDED RELEASE ORAL EVERY EVENING
COMMUNITY
Start: 2023-11-02 | End: 2024-02-12 | Stop reason: SDUPTHER

## 2024-02-12 RX ORDER — FUROSEMIDE 20 MG/1
20 TABLET ORAL DAILY
COMMUNITY
Start: 2024-02-01

## 2024-02-12 RX ORDER — METOPROLOL TARTRATE 75 MG/1
TABLET, FILM COATED ORAL
COMMUNITY
Start: 2024-01-31 | End: 2024-02-12 | Stop reason: SDUPTHER

## 2024-02-12 RX ORDER — PREGABALIN 75 MG/1
75 CAPSULE ORAL 3 TIMES DAILY PRN
COMMUNITY
Start: 2023-11-02

## 2024-02-12 RX ORDER — ROSUVASTATIN CALCIUM 5 MG/1
5 TABLET, COATED ORAL NIGHTLY
COMMUNITY

## 2024-02-12 NOTE — PATIENT INSTRUCTIONS
Recommendations:  -low sodium diet   -fluid restriction - 1.5L to 2L per day    Patient Education        DASH Diet: Care Instructions  Your Care Instructions     The DASH diet is an eating plan that can help lower your blood pressure. DASH stands for Dietary Approaches to Stop Hypertension. Hypertension is high blood pressure.  The DASH diet focuses on eating foods that are high in calcium, potassium, and magnesium. These nutrients can lower blood pressure. The foods that are highest in these nutrients are fruits, vegetables, low-fat dairy products, nuts, seeds, and legumes. But taking calcium, potassium, and magnesium supplements instead of eating foods that are high in those nutrients does not have the same effect. The DASH diet also includes whole grains, fish, and poultry.  The DASH diet is one of several lifestyle changes your doctor may recommend to lower your high blood pressure. Your doctor may also want you to decrease the amount of sodium in your diet. Lowering sodium while following the DASH diet can lower blood pressure even further than just the DASH diet alone.  Follow-up care is a key part of your treatment and safety. Be sure to make and go to all appointments, and call your doctor if you are having problems. It's also a good idea to know your test results and keep a list of the medicines you take.  How can you care for yourself at home?  Following the DASH diet  Eat 4 to 5 servings of fruit each day. A serving is 1 medium-sized piece of fruit, 1/2 cup raw or canned fruit, 1/4 cup dried fruit, or 4 ounces (1/2 cup) of fruit juice. Choose fruit more often than fruit juice.  Eat 4 to 5 servings of vegetables each day. A serving is 1 cup of lettuce or raw leafy vegetables, 1/2 cup of chopped or cooked vegetables, or 4 ounces (1/2 cup) of vegetable juice. Choose vegetables more often than vegetable juice.  Get 2 to 3 servings of low-fat and fat-free dairy each day. A serving is 8 ounces of milk, 1 cup of

## 2024-02-12 NOTE — PROGRESS NOTES
Jabari Allen (:  1960) is a 63 y.o. female, with history significant for chronic diastolic CHF, HTN, afib on pradaxa  who presents for a hospital follow-up.    She was admitted at [facility] from 24 to 2024 for acute on chronic diastolic CHF and A-fib with RVR.  Chest x-ray showed mild interstitial edema. She was managed with IV Lasix with improvement in shortness of breath.  She was transition to p.o. Lasix 20 mg on discharge.  For A-fib, her metoprolol was increased with improvement in her weight.  She remained on Pradaxa for anticoagulation.    Jabari states that she has not had any recurrence of palpitations since being home.  She has no shortness of breath.  However, her right leg pain has significantly worsened since being home.  She reports this is a shooting pain that goes all the way down her leg.  She reports she has had similar pain in the past on her left side and it improved with a procedure that was completed on her lower back.  She states any sort of movement worsens the pain significantly.  She denies chest pain, abdominal pain, dizziness or lightheadedness, presyncope or syncope, orthopnea, PND, lower extremity edema.  She reports that she took her medications this morning.    Relevant results:  ECHO CONCLUSIONS 24    * Limited echocardiogram to assess systolic function.    * Left ventricular systolic function is hyperdynamic with an ejection  fraction of 70 % by visual estimation.    * Right ventricular chamber dimension is mildly enlarged with visually  mildly reduced systolic function.    * There is moderate tricuspid valve regurgitation.    * Moderately elevated pulmonary artery pressure 55 mmHg.    * There is small pericardial effusion seen by the right heart measuring <1  cm, does not appear to be of any hemodynamic significance but difficult to  assess for respiratory variation due to arrhythmia, IVC does collapse >50%  with inspiration.